# Patient Record
Sex: FEMALE | Race: WHITE | Employment: FULL TIME | URBAN - METROPOLITAN AREA
[De-identification: names, ages, dates, MRNs, and addresses within clinical notes are randomized per-mention and may not be internally consistent; named-entity substitution may affect disease eponyms.]

---

## 2017-01-03 ENCOUNTER — TELEPHONE (OUTPATIENT)
Dept: ONCOLOGY | Age: 52
End: 2017-01-03

## 2017-01-03 RX ORDER — ANASTROZOLE 1 MG/1
1 TABLET ORAL DAILY
Qty: 90 TAB | Refills: 3 | Status: SHIPPED | OUTPATIENT
Start: 2017-01-03 | End: 2017-06-13

## 2017-01-03 NOTE — TELEPHONE ENCOUNTER
Patient called requesting a return call from a nurse to get her results from her estrogen testing. CB# is [phone]. Thanks.

## 2017-01-03 NOTE — TELEPHONE ENCOUNTER
Called and advised patient of lab results and that per Dr. Marnie Auguste, it was ok to start her Anastrozole. Patient voices understanding and denies any further questions at this time.

## 2017-01-16 ENCOUNTER — APPOINTMENT (OUTPATIENT)
Dept: INFUSION THERAPY | Age: 52
End: 2017-01-16

## 2017-01-19 ENCOUNTER — TELEPHONE (OUTPATIENT)
Dept: ONCOLOGY | Age: 52
End: 2017-01-19

## 2017-01-19 RX ORDER — DIPHENHYDRAMINE HCL 25 MG
50 CAPSULE ORAL ONCE
Status: ACTIVE | OUTPATIENT
Start: 2017-01-24 | End: 2017-01-25

## 2017-01-19 RX ORDER — SODIUM CHLORIDE 9 MG/ML
25 INJECTION, SOLUTION INTRAVENOUS CONTINUOUS
Status: DISPENSED | OUTPATIENT
Start: 2017-01-24 | End: 2017-01-25

## 2017-01-24 ENCOUNTER — HOSPITAL ENCOUNTER (OUTPATIENT)
Dept: INFUSION THERAPY | Age: 52
Discharge: HOME | End: 2017-01-24

## 2017-01-24 RX ORDER — SODIUM CHLORIDE 9 MG/ML
10 INJECTION INTRAMUSCULAR; INTRAVENOUS; SUBCUTANEOUS AS NEEDED
Status: ACTIVE | OUTPATIENT
Start: 2017-01-24 | End: 2017-01-25

## 2017-01-24 RX ORDER — SODIUM CHLORIDE 0.9 % (FLUSH) 0.9 %
5-10 SYRINGE (ML) INJECTION AS NEEDED
Status: ACTIVE | OUTPATIENT
Start: 2017-01-24 | End: 2017-01-24

## 2017-01-24 RX ORDER — HEPARIN 100 UNIT/ML
500 SYRINGE INTRAVENOUS AS NEEDED
Status: ACTIVE | OUTPATIENT
Start: 2017-01-24 | End: 2017-01-25

## 2017-01-24 NOTE — PROGRESS NOTES
[facility] - Chemotherapy Progress Note    1300 Pt admit to [facility] for C4 Herceptin ambulatory in stable condition. Assessment completed. No new concerns voiced. PAC with positive blood return and flush. Chemotherapy Flowsheet 1/24/2017   Cycle C4   Date 1/24/2017   Drug / Regimen Herceptin   Notes 90 minutes per pt       There were no vitals taken for this visit. Medications:  NS IV  Herceptin IV      *** Pt tolerated treatment well. PAC maintained positive blood return throughout treatment, flushed with positive blood return at conclusion and de accessed. D/c home ambulatory in no distress.  Pt aware of next appointment scheduled for ***.    ***labs

## 2017-01-26 ENCOUNTER — TELEPHONE (OUTPATIENT)
Dept: ONCOLOGY | Age: 52
End: 2017-01-26

## 2017-01-26 RX ORDER — DIPHENHYDRAMINE HCL 25 MG
50 CAPSULE ORAL ONCE
Status: ACTIVE | OUTPATIENT
Start: 2017-01-31 | End: 2017-02-01

## 2017-01-26 RX ORDER — SODIUM CHLORIDE 9 MG/ML
25 INJECTION, SOLUTION INTRAVENOUS CONTINUOUS
Status: DISPENSED | OUTPATIENT
Start: 2017-01-31 | End: 2017-02-01

## 2017-01-26 NOTE — TELEPHONE ENCOUNTER
Called the patient and left a message that Dr. Juli Khoury recommends delaying for a week due to mono and that this office will call the patient back tomorrow 1/27/17 to re-schedule her appointments but to call Dr. Jese Mayorga office before 5:00 pm if she has any further questions or concerns.

## 2017-01-26 NOTE — TELEPHONE ENCOUNTER
Patient called and stated that she rescheduled her appointment earlier in the week because she thought she had strep but she just left her PCP and they said it is mono. Patient would like to know what this means for her treatment, should she still come in next week or wait? # [phone] patient stated that she has a phone conference at 3:30PM and it will take a while.

## 2017-01-31 ENCOUNTER — HOSPITAL ENCOUNTER (OUTPATIENT)
Dept: INFUSION THERAPY | Age: 52
Discharge: HOME | End: 2017-01-31

## 2017-01-31 ENCOUNTER — TELEPHONE (OUTPATIENT)
Dept: ONCOLOGY | Age: 52
End: 2017-01-31

## 2017-01-31 NOTE — TELEPHONE ENCOUNTER
Called the patient and verified ID x 2. The patient stated that her appointment was re-scheduled for 2/7/17 and that blood work was not drawn to determine that she has mono, that she has been on a Z-pack for at least two weeks, did not check her temperature so was not able to verify a fever the last two weeks but she has not felt like she has had a fever since she had cold sweats over two weeks ago and went to a doctor with a temperature of 101, and has been taking over-the-counter medication for her sinus \"issues\" and inquired if lab work will be drawn before her office visit. Informed the patient that lab work is usually drawn before an office visit with Dr. Damaris Summers and treatment is usually after the blood work and the office visit with Dr. Damaris Summers. Encouraged the patient to call Dr. Griselda Howard office on Friday to determine if the appointment on 2/7/17 should be re-scheduled due to how she feels. Also, informed the patient that she is due for an ECHO as well. The patient verbalized understanding and stated that she is available anytime this week for an ECHO and denied any further questions or concerns.

## 2017-01-31 NOTE — TELEPHONE ENCOUNTER
Called the patient and verified ID x 2. Informed the patient that she is scheduled for an ECHO at [facility] on Thursday 2/2/17 at 10:00 am.  The patient verbalized understanding and stated that she will be able to make that date and time. The patient stated that she finished radiation at the beginning of last October and inquired when she should have a mammogram and if she should be concerned that the lymph nodes in her neck are tender and the lymph nodes under her arms are swollen. Informed the patient that Dr. Stan Rob and Cesia Diaz NP will be made aware of her concerns and this office will call her back with updates. The patient verbalized understanding and denied any further questions or concerns.

## 2017-02-02 ENCOUNTER — CLINICAL SUPPORT (OUTPATIENT)
Dept: CARDIOLOGY CLINIC | Age: 52
End: 2017-02-02

## 2017-02-02 DIAGNOSIS — C50.912 MALIGNANT NEOPLASM OF LEFT FEMALE BREAST, UNSPECIFIED SITE OF BREAST: Primary | ICD-10-CM

## 2017-02-02 RX ORDER — DIPHENHYDRAMINE HCL 25 MG
50 CAPSULE ORAL ONCE
Status: COMPLETED | OUTPATIENT
Start: 2017-02-07 | End: 2017-02-07

## 2017-02-02 RX ORDER — SODIUM CHLORIDE 9 MG/ML
25 INJECTION, SOLUTION INTRAVENOUS CONTINUOUS
Status: DISPENSED | OUTPATIENT
Start: 2017-02-07 | End: 2017-02-08

## 2017-02-02 RX ORDER — DIPHENHYDRAMINE HCL 25 MG
50 CAPSULE ORAL ONCE
Status: DISCONTINUED | OUTPATIENT
Start: 2017-02-07 | End: 2017-02-02

## 2017-02-02 RX ORDER — SODIUM CHLORIDE 9 MG/ML
25 INJECTION, SOLUTION INTRAVENOUS CONTINUOUS
Status: DISCONTINUED | OUTPATIENT
Start: 2017-02-07 | End: 2017-02-02

## 2017-02-07 ENCOUNTER — HOSPITAL ENCOUNTER (OUTPATIENT)
Dept: INFUSION THERAPY | Age: 52
Discharge: HOME | End: 2017-02-07
Payer: COMMERCIAL

## 2017-02-07 ENCOUNTER — OFFICE VISIT (OUTPATIENT)
Dept: ONCOLOGY | Age: 52
End: 2017-02-07

## 2017-02-07 VITALS
WEIGHT: 201.6 LBS | BODY MASS INDEX: 33.55 KG/M2 | OXYGEN SATURATION: 98 % | TEMPERATURE: 97.8 F | DIASTOLIC BLOOD PRESSURE: 84 MMHG | HEART RATE: 84 BPM | SYSTOLIC BLOOD PRESSURE: 125 MMHG | RESPIRATION RATE: 18 BRPM

## 2017-02-07 VITALS
SYSTOLIC BLOOD PRESSURE: 119 MMHG | TEMPERATURE: 97.3 F | HEART RATE: 83 BPM | BODY MASS INDEX: 33.59 KG/M2 | HEIGHT: 65 IN | WEIGHT: 201.6 LBS | RESPIRATION RATE: 18 BRPM | OXYGEN SATURATION: 99 % | DIASTOLIC BLOOD PRESSURE: 84 MMHG

## 2017-02-07 DIAGNOSIS — R63.5 WEIGHT GAIN: ICD-10-CM

## 2017-02-07 DIAGNOSIS — J01.01 ACUTE RECURRENT MAXILLARY SINUSITIS: ICD-10-CM

## 2017-02-07 DIAGNOSIS — G62.9 NEUROPATHY: ICD-10-CM

## 2017-02-07 DIAGNOSIS — C50.912 MALIGNANT NEOPLASM OF LEFT FEMALE BREAST, UNSPECIFIED SITE OF BREAST: Primary | ICD-10-CM

## 2017-02-07 DIAGNOSIS — R53.0 NEOPLASTIC MALIGNANT RELATED FATIGUE: ICD-10-CM

## 2017-02-07 PROCEDURE — 74011250636 HC RX REV CODE- 250/636: Performed by: INTERNAL MEDICINE

## 2017-02-07 PROCEDURE — 77030012965 HC NDL HUBR BBMI -A

## 2017-02-07 PROCEDURE — 74011250637 HC RX REV CODE- 250/637: Performed by: INTERNAL MEDICINE

## 2017-02-07 PROCEDURE — 96413 CHEMO IV INFUSION 1 HR: CPT

## 2017-02-07 RX ORDER — SODIUM CHLORIDE 0.9 % (FLUSH) 0.9 %
10-40 SYRINGE (ML) INJECTION AS NEEDED
Status: ACTIVE | OUTPATIENT
Start: 2017-02-07 | End: 2017-02-08

## 2017-02-07 RX ORDER — HEPARIN 100 UNIT/ML
500 SYRINGE INTRAVENOUS AS NEEDED
Status: ACTIVE | OUTPATIENT
Start: 2017-02-07 | End: 2017-02-08

## 2017-02-07 RX ORDER — DOXYCYCLINE 100 MG/1
100 TABLET ORAL 2 TIMES DAILY
Qty: 14 TAB | Refills: 0 | Status: SHIPPED | OUTPATIENT
Start: 2017-02-07 | End: 2017-02-14

## 2017-02-07 RX ORDER — SODIUM CHLORIDE 9 MG/ML
10 INJECTION INTRAMUSCULAR; INTRAVENOUS; SUBCUTANEOUS AS NEEDED
Status: ACTIVE | OUTPATIENT
Start: 2017-02-07 | End: 2017-02-08

## 2017-02-07 RX ADMIN — SODIUM CHLORIDE 25 ML/HR: 900 INJECTION, SOLUTION INTRAVENOUS at 15:29

## 2017-02-07 RX ADMIN — DIPHENHYDRAMINE HYDROCHLORIDE 50 MG: 25 CAPSULE ORAL at 14:52

## 2017-02-07 RX ADMIN — Medication 540 MG: at 15:35

## 2017-02-07 NOTE — PROGRESS NOTES
[employer]  [address], [address]  [place] [address]  W: [phone]  F: [phone]     f/u HEME/ONC CONSULT    Reason for visit: evaluation for treatment for    Breast Cancer    Consulting physicians:  Dr. James Duvall; Dr. Cl Valentin    HPI:   Mohsen Norwood is a 49 y.o.  female who is transferring her care for management of breast cancer. 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 3/9/16 left lumpectomy showed 1.3 cm of IDC, ER + at 82%, IL negative, HER 2 positive (IHC 3+; ratio 5; sig/cell 7.5),  0/2 LN involved, ki67 68%, gr 3, multiple satelite nodules up to 4 mm. [id number]. Was treated at [address] [place] [address] with Dr. Marcene Mcburney. Received TH as in APT x 12 weeks (took 14 weeks due to neutropenia). Taxol was ended up DR by 30%. 4/22/16 this started, ended 7/22/16. Did have stomatitis with the outback herceptin. Started 8/19/16    S/p XRT 10/7/16    Did have a bone density study, normal per pt -- done by Dr. Ana Escobar. Interval history:  In today for outback herceptin. Complains of gr 2 loss of appetite, gr 2 bleeding, gr 1 diarrhea, gr 1-2 fatigue, gr 1 chills, gr 1 hot flashes, gr 1-2 insomnia, gr 1-2 anxiety, gr 1 incontinence, gr 1 vaginal dryness. She was treated for strep/mono since we last saw her. She just got back from the [facility]. She leaves for [employer] this weekend. She stopped her anastrozole when she got sick but is going to restart today. Is walking 3-4 hours a day. DX   Encounter Diagnosis   Name Primary?     Malignant neoplasm of left female breast, unspecified site of breast ([facility] Yes      Past Medical History   Diagnosis Date    Abnormal Pap smear 1990s     cryotherapy, no abnormals since    Contact dermatitis and other eczema, due to unspecified cause     Depression     Endometriosis     Malignant neoplasm of left female breast ([facility] 3/28/2016    Oligomenorrhea currently not having menstrual cycles X 3 months     Past Surgical History   Procedure Laterality Date    Hx heent       scarring from sinusitis.  Hx gyn       cervical cryotherapy      Social History     Social History    Marital status:      Spouse name: N/A    Number of children: 1    Years of education: N/A     Occupational History     [employer]      Social History Main Topics    Smoking status: Former Smoker    Smokeless tobacco: None    Alcohol use 1.0 oz/week     2 Cans of beer per week    Drug use: None    Sexual activity: Not Currently     Partners: Male     Birth control/ protection: Condom     Other Topics Concern    None     Social History Narrative     History reviewed. No pertinent family history. Current Outpatient Prescriptions   Medication Sig Dispense Refill    protein powd Take  by mouth daily.  Biotin 2,500 mcg cap Take  by mouth daily.  diazepam (VALIUM) 2 mg tablet Take 2 mg by mouth nightly as needed. 3    CYANOCOBALAMIN, VITAMIN B-12, (VITAMIN B-12 PO) Take  by mouth daily.  CHOLECALCIFEROL, VITAMIN D3, (VITAMIN D3 PO) Take 1,000 Units by mouth daily.  LACTOBACILLUS ACIDOPHILUS (PROBIOTIC PO) Take  by mouth daily.  MULTIVITAMIN (MULTIPLE VITAMIN PO) Take  by mouth daily.  anastrozole (ARIMIDEX) 1 mg tablet Take 1 Tab by mouth daily. 90 Tab 3    lidocaine-prilocaine (EMLA) topical cream Apply  to affected area as needed for Pain. 30 g 0    ibuprofen (ADVIL) 200 mg tablet Take 200 mg by mouth.  ondansetron (ZOFRAN ODT) 4 mg disintegrating tablet Take 1 Tab by mouth every eight (8) hours as needed for Nausea.  20 Tab 0     Facility-Administered Medications Ordered in Other Visits   Medication Dose Route Frequency Provider Last Rate Last Dose    sodium chloride 0.9 % injection 10 mL  10 mL IntraVENous PRN Marianne Mace MD        heparin (porcine) pf 500 Units  500 Units IntraVENous PRN Marianne Mace MD       [facility] sodium chloride (NS) flush 10-40 mL  10-40 mL IntraVENous PRN Gigi Hodges MD        0.9% sodium chloride infusion  25 mL/hr IntraVENous CONTINUOUS Gigi Hodges MD        diphenhydrAMINE (BENADRYL) capsule 50 mg  50 mg Oral ONCE Gigi Hodges MD        trastuzumab (HERCEPTIN) 540 mg in 0.9% sodium chloride 250 mL, overfill volume 25 mL IVPB  540 mg IntraVENous ONCE Gigi Hodges MD         Allergies   Allergen Reactions    Augmentin [Amoxicillin-Pot Clavulanate] Nausea and Vomiting     Projectile vomiting, sweats, profuse diarrhea    Gluten Other (comments)     insensitity    Other Medication Hives     H2 antagonists,      Review of Systems    A comprehensive review of systems was performed and all systems were negative except for HPI and for the symptom review form, reviewed and scanned in.    Objective:  Visit Vitals    /84    Pulse 83    Temp 97.3 °F (36.3 °C) (Temporal)    Resp 18    Ht 5' 5\" (1.651 m)    Wt 201 lb 9.6 oz (91.4 kg)    SpO2 99%    BMI 33.55 kg/m2         General:  Alert, cooperative, no distress, appears stated age. Head:  Normocephalic, without obvious abnormality, atraumatic. Eyes:  Conjunctivae/corneas clear. PERRL, EOMs intact. Throat: Lips, mucosa, and tongue normal.    Neck: Supple, symmetrical, trachea midline, no adenopathy, thyroid: no enlargement/tenderness/nodules   Back:   Symmetric, no curvature. ROM normal. No CVA tenderness. Lungs:   Clear to auscultation bilaterally. Chest wall:  No tenderness or deformity. Heart:  Regular rate and rhythm, S1, S2 normal, no murmur, click, rub or gallop. Abdomen:   Soft, non-tender. Bowel sounds normal. No masses,  No organomegaly. Extremities: Extremities normal, atraumatic, no cyanosis or edema. Skin: Skin color, texture, turgor normal. No rashes or lesions. Lymph nodes: Cervical, supraclavicular, and axillary nodes normal.   Neurologic: CNII-XII intact.        Diagnostic Imaging   No results found for this or any previous visit. Lab Results  Lab Results   Component Value Date/Time    WBC 6.5 10/03/2014 03:36 PM    HGB 13.6 10/03/2014 03:36 PM    HCT 41.2 10/03/2014 03:36 PM    PLATELET 167 07/31/2737 03:36 PM    MCV 91 10/03/2014 03:36 PM     Lab Results   Component Value Date/Time    Sodium 142 11/14/2016 04:12 PM    Potassium 3.8 11/14/2016 04:12 PM    Chloride 105 11/14/2016 04:12 PM    CO2 32 11/14/2016 04:12 PM    Anion gap 5 11/14/2016 04:12 PM    Glucose 111 11/14/2016 04:12 PM    BUN 10 11/14/2016 04:12 PM    Creatinine 0.77 11/14/2016 04:12 PM    BUN/Creatinine ratio 13 11/14/2016 04:12 PM    GFR est AA >60 11/14/2016 04:12 PM    GFR est non-AA >60 11/14/2016 04:12 PM    Calcium 8.5 11/14/2016 04:12 PM     Assessment/Plan:  49 y.o. female with L IDC, ER +, NV negative, HER 2 +, 1.3 cm, gr 3, 0/2 LN involved. PS 0    1. Left Breast cancer stage: IA    Hormonal therapy: administered    We explained to the patient that the goal of systemic adjuvant therapy is to improve the chances for cure and decrease the risk of relapse. We explained why a patient can have microscopic cancer spread now even though physical examination, laboratory studies and imaging studies are negative for cancer. We explained that the same treatments used now as adjuvant or preventive treatments rarely if ever are curative in women who develop metastases. Using PREDICT!, without therapy, her 10 year OS is 79%; with therapy that increases to 89%, endocrine therapy is 5% of that. Rationale for therapy with trastuzumab was also discussed with the patient including a 50% proportional improvement in disease free survival and also an improvement in overall survival in patients receiving trastuzumab and chemotherapy for HER-2 positive breast cancer. The side effects of trastuzumab were discussed including a 4%-5% risk of dropping her ejection fraction while on treatment and about a 1% risk of CHF.   We discussed that this drug will be used every 3 weeks for remainder of a year following the chemotherapy cycles. We will check her EF before chemotherapy and every 3 months while she is receiving trastuzumab. Informed consent signed. She feels that she is still cycling, but not having periods. FSH, LH, and estradiol reviewed from 6/1/15, appears postmenopausal, but estradiol not < 6, but close. Checked her [facility], LH, estradiol in 12/2016 which confirmed that she is postmenopausal. Has not had a period in 3 years. Resume anastrozole 1 mg daily. In today for outback Herceptin #4 will see her in 3 weeks for #5. TTE on 2/2/17, EF 69%. Next due before 4/27/17. Eliel mammogram and breast MRI due at the end of March. She would prefer these at [facility], we will have Dr. Marcello Lynch order these. I recommend yearly mammogram and breast MRI. 2. Emotional well being: She has excellent support and is coping well with her disease. 3. Side effects from trastuzumab: Including muscle pain, mouth sores, fatigue. Will monitor, pre med with benadryl. 4. 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 Mostly in fingers. Will monitor. 5. Weight gain/loss of appetite: She has a desire to get back down to her pre chemo weight. Abbie Perea RD has reached out to her. 6. Pharyngitis/Sinusitis: maxillary sinus tenderness, treated with z alonso for 2 weeks with minimal improvement. Will start Doxycycline 100 mg bid x 7 days, rx in.    > 25 minutes were spent with this patient with > 50% of that time spent in face to face counseling. There are no Patient Instructions on file for this visit.    Follow-up Disposition: Not on Colt Guaman MD

## 2017-02-07 NOTE — MR AVS SNAPSHOT
Visit Information Date & Time Provider Department Dept. Phone Encounter #  
 2/7/2017  1:45 PM Aminta Nath NP [facility] at [address] [facility] [phone] Follow-up Instructions Return in about 3 weeks (around 2/28/2017) for fu, sand/mikael, opic herceptin 5. Upcoming Health Maintenance Date Due Pneumococcal 19-64 Highest Risk (1 of 3 - PCV13) 2/15/1985 DTaP/Tdap/Td series (1 - Tdap) 2/15/1987 FOBT Q 1 YEAR AGE 50-75 2/15/2016 INFLUENZA AGE 9 TO ADULT 8/1/2016 BREAST CANCER SCRN MAMMOGRAM 2/12/2018 PAP AKA CERVICAL CYTOLOGY 6/1/2018 Allergies as of 2/7/2017  Review Complete On: 2/7/2017 By: Aminta Nath NP Severity Noted Reaction Type Reactions Augmentin [Amoxicillin-pot Clavulanate]  08/29/2013    Nausea and Vomiting Projectile vomiting, sweats, profuse diarrhea Gluten  09/24/2014    Other (comments)  
 insensitity Other Medication  06/24/2013    Hives H2 antagonists,   
  
Current Immunizations  Reviewed on 11/14/2016 No immunizations on file. Not reviewed this visit You Were Diagnosed With   
  
 Codes Comments Malignant neoplasm of left female breast, unspecified site of breast ([facility]    -  Primary ICD-10-CM: N15.467 ICD-9-CM: 174.9 Neuropathy     ICD-10-CM: G62.9 ICD-9-CM: 355.9 Weight gain     ICD-10-CM: R63.5 ICD-9-CM: 783.1 Neoplastic malignant related fatigue     ICD-10-CM: R53.0 ICD-9-CM: 780.79 Vitals BP Pulse Temp Resp Height(growth percentile) Weight(growth percentile) 119/84 83 97.3 °F (36.3 °C) (Temporal) 18 5' 5\" (1.651 m) 201 lb 9.6 oz (91.4 kg) SpO2 BMI OB Status Smoking Status 99% 33.55 kg/m2 Menopause Former Smoker Vitals History BMI and BSA Data Body Mass Index Body Surface Area  
 33.55 kg/m 2 2.05 m 2 Preferred Pharmacy Pharmacy Name Phone [id number]- [address] [address], [address] [phone] Your Updated Medication List  
  
   
This list is accurate as of: 2/7/17  2:21 PM.  Always use your most recent med list. ADVIL 200 mg tablet Generic drug:  ibuprofen Take 200 mg by mouth. anastrozole 1 mg tablet Commonly known as:  ARIMIDEX Take 1 Tab by mouth daily. Biotin 2,500 mcg Cap Take  by mouth daily. diazePAM 2 mg tablet Commonly known as:  VALIUM Take 2 mg by mouth nightly as needed. doxycycline 100 mg tablet Commonly known as:  ADOXA Take 1 Tab by mouth two (2) times a day for 7 days. lidocaine-prilocaine topical cream  
Commonly known as:  EMLA Apply  to affected area as needed for Pain. MULTIPLE VITAMIN PO Take  by mouth daily. ondansetron 4 mg disintegrating tablet Commonly known as:  ZOFRAN ODT Take 1 Tab by mouth every eight (8) hours as needed for Nausea. PROBIOTIC PO Take  by mouth daily. protein Powd Take  by mouth daily. VITAMIN B-12 PO Take  by mouth daily. VITAMIN D3 PO Take 1,000 Units by mouth daily. Prescriptions Sent to Pharmacy Refills  
 doxycycline (ADOXA) 100 mg tablet 0 Sig: Take 1 Tab by mouth two (2) times a day for 7 days. Class: Normal  
 Pharmacy: [address] [place] [address], [address] Ph #: [phone] Route: Oral  
  
Follow-up Instructions Return in about 3 weeks (around 2/28/2017) for fu, sand/mikael, patricia herceptin 5.   
  
To-Do List   
 02/28/2017 2:00 PM  
  Appointment with [address] at [place] [address] ([phone])  
  
 03/21/2017 1:00 PM  
  Appointment with [address] at [place] [address] ([phone])  
  
 04/11/2017 1:00 PM  
  Appointment with [address] at [place] [address] ([phone])  
  
 05/02/2017 1:00 PM  
 Appointment with [address] at [place] [address] ([phone]) Patient Instructions Come see us in 3 weeks. Introducing [facility]! Mary Ibrahim introduces [employer] patient portal. Now you can access parts of your medical record, email your doctor's office, and request medication refills online. 1. In your internet browser, go to [email or web address] 2. Click on the First Time User? Click Here link in the Sign In box. You will see the New Member Sign Up page. 3. Enter your [employer] Access Code exactly as it appears below. You will not need to use this code after youve completed the sign-up process. If you do not sign up before the expiration date, you must request a new code. · [employer] Access Code: [email or web address] Expires: 3/5/2017  2:04 PM 
 
4. Enter the last four digits of your Social Security Number (xxxx) and Date of Birth (mm/dd/yyyy) as indicated and click Submit. You will be taken to the next sign-up page. 5. Create a [employer] ID. This will be your [employer] login ID and cannot be changed, so think of one that is secure and easy to remember. 6. Create a [employer] password. You can change your password at any time. 7. Enter your Password Reset Question and Answer. This can be used at a later time if you forget your password. 8. Enter your e-mail address. You will receive e-mail notification when new information is available in [address]. 9. Click Sign Up. You can now view and download portions of your medical record. 10. Click the Download Summary menu link to download a portable copy of your medical information. If you have questions, please visit the Frequently Asked Questions section of the [employer] website. Remember, [employer] is NOT to be used for urgent needs. For medical emergencies, dial 911. Now available from your iPhone and Android! Please provide this summary of care documentation to your next provider. Your primary care clinician is listed as Yajaira Wagner. If you have any questions after today's visit, please call [phone].

## 2017-02-07 NOTE — PROGRESS NOTES
[facility] VISIT NOTE    1310  Pt arrived at [place] ambulatory and in no distress for C4 herceptin. Assessment completed, pt has no concern. Chemotherapy Flowsheet 2/7/2017   Cycle C5   Date 2/7/2017   Drug / Regimen herceptin   Notes 90 min per patient request        Right chest port accessed with 0.75 in [place] with no difficulty. Positive blood return noted and labs drawn. Medications received:  Trastuzumab    Tolerated treatment well, no adverse reaction noted. Port de-accessed and flushed per protocol. Positive blood return noted. Patient Vitals for the past 12 hrs:   Temp Pulse Resp BP SpO2   02/07/17 1707 97.8 °F (36.6 °C) 84 18 125/84 98 %   02/07/17 1312 97.3 °F (36.3 °C) 83 18 119/84 99 %       1710  D/C'd from [place] ambulatory and in no distress .

## 2017-02-07 NOTE — PROGRESS NOTES
[address], [address] [facility] [place] [address]  W: [phone]  F: [phone]     f/u HEME/ONC CONSULT    Reason for visit: evaluation for treatment for    Breast Cancer    Consulting physicians:  Dr. Kathy Verdugo; Dr. Neli Gilmore    HPI:   Judge Mitchell is a 49 y.o.  female who is transferring her care for management of breast cancer. 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 3/9/16 left lumpectomy showed 1.3 cm of IDC, ER + at 82%, IA negative, HER 2 positive (IHC 3+; ratio 5; sig/cell 7.5),  0/2 LN involved, ki67 68%, gr 3, multiple satelite nodules up to 4 mm. [id number]. Was treated at [address] [place] [address] with Dr. Florencia Pantoja. Received TH as in APT x 12 weeks (took 14 weeks due to neutropenia). Taxol was ended up DR by 30%. 4/22/16 this started, ended 7/22/16. Did have stomatitis with the outback herceptin. Started 8/19/16    S/p XRT 10/7/16    Did have a bone density study, normal per pt -- done by Dr. Clark Bobo. Interval history:  In today for outback herceptin. Complains of gr 2 loss of appetite, gr 2 bleeding, gr 1 diarrhea, gr 1-2 fatigue, gr 1 chills, gr 1 hot flashes, gr 1-2 insomnia, gr 1-2 anxiety, gr 1 incontinence, gr 1 vaginal dryness. She was treated for strep/mono since we last saw her. She just got back from the [facility]. She leaves for [place] this weekend. She stopped her anastrozole when she got sick but is going to restart today. Is walking 3-4 hours a day. DX   Encounter Diagnosis   Name Primary?     Malignant neoplasm of left female breast, unspecified site of breast ([facility] Yes      Past Medical History   Diagnosis Date    Abnormal Pap smear 1990s     cryotherapy, no abnormals since    Contact dermatitis and other eczema, due to unspecified cause     Depression     Endometriosis     Malignant neoplasm of left female breast ([facility] 3/28/2016    Oligomenorrhea currently not having menstrual cycles X 3 months     Past Surgical History   Procedure Laterality Date    Hx heent       scarring from sinusitis.  Hx gyn       cervical cryotherapy      Social History     Social History    Marital status:      Spouse name: N/A    Number of children: 1    Years of education: N/A     Occupational History     [employer]      Social History Main Topics    Smoking status: Former Smoker    Smokeless tobacco: None    Alcohol use 1.0 oz/week     2 Cans of beer per week    Drug use: None    Sexual activity: Not Currently     Partners: Male     Birth control/ protection: Condom     Other Topics Concern    None     Social History Narrative     History reviewed. No pertinent family history. Current Outpatient Prescriptions   Medication Sig Dispense Refill    protein powd Take  by mouth daily.  Biotin 2,500 mcg cap Take  by mouth daily.  diazepam (VALIUM) 2 mg tablet Take 2 mg by mouth nightly as needed. 3    CYANOCOBALAMIN, VITAMIN B-12, (VITAMIN B-12 PO) Take  by mouth daily.  CHOLECALCIFEROL, VITAMIN D3, (VITAMIN D3 PO) Take 1,000 Units by mouth daily.  LACTOBACILLUS ACIDOPHILUS (PROBIOTIC PO) Take  by mouth daily.  MULTIVITAMIN (MULTIPLE VITAMIN PO) Take  by mouth daily.  anastrozole (ARIMIDEX) 1 mg tablet Take 1 Tab by mouth daily. 90 Tab 3    lidocaine-prilocaine (EMLA) topical cream Apply  to affected area as needed for Pain. 30 g 0    ibuprofen (ADVIL) 200 mg tablet Take 200 mg by mouth.  ondansetron (ZOFRAN ODT) 4 mg disintegrating tablet Take 1 Tab by mouth every eight (8) hours as needed for Nausea.  20 Tab 0     Facility-Administered Medications Ordered in Other Visits   Medication Dose Route Frequency Provider Last Rate Last Dose    sodium chloride 0.9 % injection 10 mL  10 mL IntraVENous PRN Lisa Montes MD        heparin (porcine) pf 500 Units  500 Units IntraVENous PRN MD Sinai Clements sodium chloride (NS) flush 10-40 mL  10-40 mL IntraVENous PRN Gigi Hodges MD        0.9% sodium chloride infusion  25 mL/hr IntraVENous CONTINUOUS Gigi Hodges MD        diphenhydrAMINE (BENADRYL) capsule 50 mg  50 mg Oral ONCE Gigi Hodges MD        trastuzumab (HERCEPTIN) 540 mg in 0.9% sodium chloride 250 mL, overfill volume 25 mL IVPB  540 mg IntraVENous ONCE Gigi Hodges MD         Allergies   Allergen Reactions    Augmentin [Amoxicillin-Pot Clavulanate] Nausea and Vomiting     Projectile vomiting, sweats, profuse diarrhea    Gluten Other (comments)     insensitity    Other Medication Hives     H2 antagonists,      Review of Systems    A comprehensive review of systems was performed and all systems were negative except for HPI and for the symptom review form, reviewed and scanned in.    Objective:  Visit Vitals    /84    Pulse 83    Temp 97.3 °F (36.3 °C) (Temporal)    Resp 18    Ht 5' 5\" (1.651 m)    Wt 201 lb 9.6 oz (91.4 kg)    SpO2 99%    BMI 33.55 kg/m2         General:  Alert, cooperative, no distress, appears stated age. Head:  Normocephalic, without obvious abnormality, atraumatic. Eyes:  Conjunctivae/corneas clear. PERRL, EOMs intact. Throat: Lips, mucosa, and tongue normal.    Neck: Supple, symmetrical, trachea midline, no adenopathy, thyroid: no enlargement/tenderness/nodules   Back:   Symmetric, no curvature. ROM normal. No CVA tenderness. Lungs:   Clear to auscultation bilaterally. Chest wall:  No tenderness or deformity. Heart:  Regular rate and rhythm, S1, S2 normal, no murmur, click, rub or gallop. Abdomen:   Soft, non-tender. Bowel sounds normal. No masses,  No organomegaly. Extremities: Extremities normal, atraumatic, no cyanosis or edema. Skin: Skin color, texture, turgor normal. No rashes or lesions. Lymph nodes: Cervical, supraclavicular, and axillary nodes normal.   Neurologic: CNII-XII intact.        Diagnostic Imaging   No results found for this or any previous visit. Lab Results  Lab Results   Component Value Date/Time    WBC 6.5 10/03/2014 03:36 PM    HGB 13.6 10/03/2014 03:36 PM    HCT 41.2 10/03/2014 03:36 PM    PLATELET 794 16/38/8830 03:36 PM    MCV 91 10/03/2014 03:36 PM     Lab Results   Component Value Date/Time    Sodium 142 11/14/2016 04:12 PM    Potassium 3.8 11/14/2016 04:12 PM    Chloride 105 11/14/2016 04:12 PM    CO2 32 11/14/2016 04:12 PM    Anion gap 5 11/14/2016 04:12 PM    Glucose 111 11/14/2016 04:12 PM    BUN 10 11/14/2016 04:12 PM    Creatinine 0.77 11/14/2016 04:12 PM    BUN/Creatinine ratio 13 11/14/2016 04:12 PM    GFR est AA >60 11/14/2016 04:12 PM    GFR est non-AA >60 11/14/2016 04:12 PM    Calcium 8.5 11/14/2016 04:12 PM     Assessment/Plan:  49 y.o. female with L IDC, ER +, NH negative, HER 2 +, 1.3 cm, gr 3, 0/2 LN involved. PS 0    1. Left Breast cancer stage: IA    Hormonal therapy: administered    We explained to the patient that the goal of systemic adjuvant therapy is to improve the chances for cure and decrease the risk of relapse. We explained why a patient can have microscopic cancer spread now even though physical examination, laboratory studies and imaging studies are negative for cancer. We explained that the same treatments used now as adjuvant or preventive treatments rarely if ever are curative in women who develop metastases. Using PREDICT!, without therapy, her 10 year OS is 79%; with therapy that increases to 89%, endocrine therapy is 5% of that. Rationale for therapy with trastuzumab was also discussed with the patient including a 50% proportional improvement in disease free survival and also an improvement in overall survival in patients receiving trastuzumab and chemotherapy for HER-2 positive breast cancer. The side effects of trastuzumab were discussed including a 4%-5% risk of dropping her ejection fraction while on treatment and about a 1% risk of CHF.   We discussed that this drug will be used every 3 weeks for remainder of a year following the chemotherapy cycles. We will check her EF before chemotherapy and every 3 months while she is receiving trastuzumab. Informed consent signed. She feels that she is still cycling, but not having periods. FSH, LH, and estradiol reviewed from 6/1/15, appears postmenopausal, but estradiol not < 6, but close. Checked her [address], LH, estradiol in 12/2016 which confirmed that she is postmenopausal. Has not had a period in 3 years. Resume anastrozole 1 mg daily. In today for outback Herceptin #4 will see her in 3 weeks for #5. TTE on 2/2/17, EF 69%. Next due before 4/27/17. Eliel mammogram and breast MRI due at the end of March. She would prefer these at [facility], we will have Dr. Alton Sumner order these. I recommend yearly mammogram and breast MRI. 2. Emotional well being: She has excellent support and is coping well with her disease. 3. Side effects from trastuzumab: Including muscle pain, mouth sores, fatigue. Will monitor, pre med with benadryl. 4. 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 Mostly in fingers. Will monitor. 5. Weight gain/loss of appetite: She has a desire to get back down to her pre chemo weight. Caleb Brown RD has reached out to her. 6. Pharyngitis/Sinusitis: maxillary sinus tenderness, treated with z alonso for 2 weeks with minimal improvement. Will start Doxycycline 100 mg bid x 7 days, rx in.    > 25 minutes were spent with this patient with > 50% of that time spent in face to face counseling. There are no Patient Instructions on file for this visit.    Follow-up Disposition: Not on Gianna Bright MD

## 2017-02-21 ENCOUNTER — APPOINTMENT (OUTPATIENT)
Dept: INFUSION THERAPY | Age: 52
End: 2017-02-21

## 2017-02-23 RX ORDER — DIPHENHYDRAMINE HCL 25 MG
50 CAPSULE ORAL ONCE
Status: COMPLETED | OUTPATIENT
Start: 2017-02-28 | End: 2017-02-28

## 2017-02-23 RX ORDER — SODIUM CHLORIDE 9 MG/ML
25 INJECTION, SOLUTION INTRAVENOUS CONTINUOUS
Status: DISPENSED | OUTPATIENT
Start: 2017-02-28 | End: 2017-03-01

## 2017-02-28 ENCOUNTER — HOSPITAL ENCOUNTER (OUTPATIENT)
Dept: INFUSION THERAPY | Age: 52
Discharge: HOME | End: 2017-02-28
Payer: COMMERCIAL

## 2017-02-28 VITALS
HEART RATE: 65 BPM | SYSTOLIC BLOOD PRESSURE: 116 MMHG | HEIGHT: 65 IN | RESPIRATION RATE: 16 BRPM | WEIGHT: 201.6 LBS | DIASTOLIC BLOOD PRESSURE: 79 MMHG | TEMPERATURE: 98.1 F | BODY MASS INDEX: 33.59 KG/M2

## 2017-02-28 PROCEDURE — 74011250636 HC RX REV CODE- 250/636

## 2017-02-28 PROCEDURE — 74011250636 HC RX REV CODE- 250/636: Performed by: INTERNAL MEDICINE

## 2017-02-28 PROCEDURE — 96413 CHEMO IV INFUSION 1 HR: CPT

## 2017-02-28 PROCEDURE — 74011250637 HC RX REV CODE- 250/637: Performed by: INTERNAL MEDICINE

## 2017-02-28 PROCEDURE — 77030012965 HC NDL HUBR BBMI -A

## 2017-02-28 RX ORDER — SODIUM CHLORIDE 0.9 % (FLUSH) 0.9 %
10-40 SYRINGE (ML) INJECTION AS NEEDED
Status: ACTIVE | OUTPATIENT
Start: 2017-02-28 | End: 2017-02-28

## 2017-02-28 RX ORDER — SODIUM CHLORIDE 9 MG/ML
10 INJECTION INTRAMUSCULAR; INTRAVENOUS; SUBCUTANEOUS AS NEEDED
Status: ACTIVE | OUTPATIENT
Start: 2017-02-28 | End: 2017-03-01

## 2017-02-28 RX ORDER — HEPARIN 100 UNIT/ML
500 SYRINGE INTRAVENOUS AS NEEDED
Status: ACTIVE | OUTPATIENT
Start: 2017-02-28 | End: 2017-03-01

## 2017-02-28 RX ADMIN — DIPHENHYDRAMINE HYDROCHLORIDE 50 MG: 25 CAPSULE ORAL at 15:32

## 2017-02-28 RX ADMIN — Medication 540 MG: at 16:32

## 2017-02-28 RX ADMIN — HEPARIN SODIUM (PORCINE) LOCK FLUSH IV SOLN 100 UNIT/ML 500 UNITS: 100 SOLUTION at 18:29

## 2017-02-28 RX ADMIN — SODIUM CHLORIDE 25 ML/HR: 900 INJECTION, SOLUTION INTRAVENOUS at 16:25

## 2017-02-28 RX ADMIN — Medication 20 ML: at 18:29

## 2017-02-28 NOTE — PROGRESS NOTES
[facility] VISIT NOTE    Pt arrived at [facility] ambulatory and in no distres. Pt c/o sinus infection which is not new. Patient Vitals for the past 12 hrs:   Temp Pulse Resp BP   02/28/17 1818 98.1 °F (36.7 °C) 65 16 116/79     Right chest port accessed with . 75  in [place] no difficulty. Positive blood return noted. Medications received:  Benedryl PO  Herceptin IV    Tolerated treatment well, no adverse reaction noted. Port de-accessed and flushed per protocol. Positive blood return noted. D/C'd from [facility] ambulatory and in no distress. Next appointment is 3/21/17 at 1:00.

## 2017-03-07 ENCOUNTER — TELEPHONE (OUTPATIENT)
Dept: ONCOLOGY | Age: 52
End: 2017-03-07

## 2017-03-07 NOTE — TELEPHONE ENCOUNTER
Called the patient and left a message to call Dr. Cindy See office back at her earliest convenience. Received a call from the patient and verified ID x 2. The patient stated that she is scheduled for an infusion in [facility] and an appointment with Dr. Maudie Gosselin on 3/21/17 but will be out of town on business and will be back on 3/22/17 but may be able to make the already scheduled appointment depending on the answer to her next question. The patient inquired how many more treatments she has left and what is the estimated date of time that her treatments will end. Informed the patient that her questions and concerns will be brought to Dr. Cindy See and Anant Baltazar NP attention and this office will call back with recommendations.

## 2017-03-07 NOTE — TELEPHONE ENCOUNTER
Patient called stating she would like a call back from either Addis or Juan Garcia concerning how many treatments she has left.  Call back number [phone]

## 2017-03-07 NOTE — TELEPHONE ENCOUNTER
Called the patient and left a message that her most recent infusion was number five of eleven treatments and to call Dr. Fredy Correia office if she has any further questions or concerns.

## 2017-03-07 NOTE — TELEPHONE ENCOUNTER
Patient called again requesting a return call because she will be going out of town soon and needs to discuss her treatment.  Call back number [phone]

## 2017-03-07 NOTE — TELEPHONE ENCOUNTER
Called the patient and verified ID x 2. The patient stated that she had surgery in March of last year, had her port placed on 4/19/16 and then started treatment at [address] [place] [address] in April of last year the same week she had her port placed and had twelve cycles of Herceptin in fourteen weeks and it was fourteen weeks because she had to stop treatment twice which delayed her treatment out to fourteen weeks. Then once she completed that she started her \"21 day dose\" and transferred her care to Dr. Addis Warren after receiving several \"21 day doses\" and by her calculations, including the three weeks that treatment was pushed back due to complications, she should only have two or three more cycles left to complete and not six. Informed the patient that Dr. Addis Warren will be made aware of her calculations and this office will call back with recommendations. The patient verbalized understanding and denied any further questions or concerns.     14 week cycle - Herceptin since April   Stopped twice = 14 weeks done on July 22nd (but only 12 doses)  Surgery - march  Port April 19th  21 day dose on   Push back 3 weeks in total    It is treatment of a year and has 6 more treatments for 18 weeks or 4.5 months - July 4th    2 or 3 out

## 2017-03-08 NOTE — TELEPHONE ENCOUNTER
Patient called to follow up with Adrian Kirkpatrick to see if he had a chance to talk to Dr. Vicki Manuel about how many cycles of Herceptin she had left.  # [phone]

## 2017-03-09 NOTE — TELEPHONE ENCOUNTER
Patient called and stated that she would like a return call back regarding how many treatments she has left.  # [phone]

## 2017-03-10 NOTE — TELEPHONE ENCOUNTER
Janeth Sim NP received a call from the patient and the patient stated that she received four doses of Herceptin at [facility] after the twelve week chemotherapy regimen. Karely Singh stated that if the patient is able to have [facility] fax the records that state that she received four doses, then Karely Singh will be able to reduce the number of Herceptin cycles from thirteen to nine. The patient also stated that she will be out of town when her next scheduled appointments with [facility] and Dr. Mary Ann Wallace are which they are both scheduled on 3/21/17 and that she is travelling back in to town on 3/23/17. Karely Singh stated that if she is able to receive Herceptin and not see Dr. Mary Ann Wallace on 3/24/17 then she will be able to keep her already scheduled appointment on 4/11/17 and then have Herceptin every three weeks from 4/11/17 and that this office will schedule an appointment to see Dr. Mary Ann Wallace on 4/11/17. The patient verbalized understanding and stated that she will have [facility] fax the records to Dr. Fredi Mayorga office and that she is available anytime for an infusion on 3/24/17. The patient denied any further questions or concerns.

## 2017-03-10 NOTE — TELEPHONE ENCOUNTER
Appointment made on 3/24/17 at 11:00 am in [facility].   Will call pt once appt for Dr. Silvio Lopez is made

## 2017-03-13 ENCOUNTER — TELEPHONE (OUTPATIENT)
Dept: ONCOLOGY | Age: 52
End: 2017-03-13

## 2017-03-13 NOTE — TELEPHONE ENCOUNTER
Called the patient and verified ID x 2. Informed the patient that Dr. Mary Ann Wallace recommends the patient's [facility] appointment and office visit with Dr. Mary Ann Wallace be rescheduled to 3/28/17 and then treatment every three weeks from 3/28/17. The patient stated that she scheduled a trip to the [facility] and will be out of the country from 4/16/17 to 4/29/17 when she would be due for her next treatment after 3/28/17. The patient also stated that she spoke with Janeth Sim NP last Friday 3/10/17 about continuing Anastrozole but she decided to stop taking it over the weekend and feels \"so much better. \"  Informed the patient that at the office visit on 3/28/17 the remainder of the treatment plan and schedule can be discussed. The patient verbalized understanding and denied any further questions or concerns.

## 2017-03-14 ENCOUNTER — APPOINTMENT (OUTPATIENT)
Dept: INFUSION THERAPY | Age: 52
End: 2017-03-14
Payer: COMMERCIAL

## 2017-03-21 ENCOUNTER — APPOINTMENT (OUTPATIENT)
Dept: INFUSION THERAPY | Age: 52
End: 2017-03-21
Payer: COMMERCIAL

## 2017-03-22 RX ORDER — DIPHENHYDRAMINE HCL 25 MG
50 CAPSULE ORAL ONCE
Status: COMPLETED | OUTPATIENT
Start: 2017-03-28 | End: 2017-03-28

## 2017-03-22 RX ORDER — SODIUM CHLORIDE 9 MG/ML
25 INJECTION, SOLUTION INTRAVENOUS CONTINUOUS
Status: DISPENSED | OUTPATIENT
Start: 2017-03-28 | End: 2017-03-28

## 2017-03-24 ENCOUNTER — APPOINTMENT (OUTPATIENT)
Dept: INFUSION THERAPY | Age: 52
End: 2017-03-24
Payer: COMMERCIAL

## 2017-03-28 ENCOUNTER — HOSPITAL ENCOUNTER (OUTPATIENT)
Dept: INFUSION THERAPY | Age: 52
Discharge: HOME | End: 2017-03-28
Payer: COMMERCIAL

## 2017-03-28 ENCOUNTER — OFFICE VISIT (OUTPATIENT)
Dept: ONCOLOGY | Age: 52
End: 2017-03-28

## 2017-03-28 VITALS
HEIGHT: 65 IN | OXYGEN SATURATION: 98 % | WEIGHT: 199.6 LBS | BODY MASS INDEX: 33.26 KG/M2 | SYSTOLIC BLOOD PRESSURE: 135 MMHG | HEART RATE: 71 BPM | RESPIRATION RATE: 16 BRPM | DIASTOLIC BLOOD PRESSURE: 78 MMHG | TEMPERATURE: 96.8 F

## 2017-03-28 VITALS
HEIGHT: 65 IN | HEART RATE: 83 BPM | BODY MASS INDEX: 33.26 KG/M2 | SYSTOLIC BLOOD PRESSURE: 133 MMHG | OXYGEN SATURATION: 95 % | WEIGHT: 199.6 LBS | RESPIRATION RATE: 16 BRPM | DIASTOLIC BLOOD PRESSURE: 85 MMHG | TEMPERATURE: 97.3 F

## 2017-03-28 DIAGNOSIS — Z79.899 ENCOUNTER FOR MONITORING CARDIOTOXIC DRUG THERAPY: ICD-10-CM

## 2017-03-28 DIAGNOSIS — R63.5 WEIGHT GAIN: ICD-10-CM

## 2017-03-28 DIAGNOSIS — C50.912 MALIGNANT NEOPLASM OF LEFT FEMALE BREAST, UNSPECIFIED SITE OF BREAST: Primary | ICD-10-CM

## 2017-03-28 DIAGNOSIS — G62.9 NEUROPATHY: ICD-10-CM

## 2017-03-28 DIAGNOSIS — Z51.81 ENCOUNTER FOR MONITORING CARDIOTOXIC DRUG THERAPY: ICD-10-CM

## 2017-03-28 DIAGNOSIS — J01.01 ACUTE RECURRENT MAXILLARY SINUSITIS: ICD-10-CM

## 2017-03-28 PROCEDURE — 74011250636 HC RX REV CODE- 250/636: Performed by: INTERNAL MEDICINE

## 2017-03-28 PROCEDURE — 77030012965 HC NDL HUBR BBMI -A

## 2017-03-28 PROCEDURE — 96413 CHEMO IV INFUSION 1 HR: CPT

## 2017-03-28 PROCEDURE — 74011250636 HC RX REV CODE- 250/636

## 2017-03-28 PROCEDURE — 74011000250 HC RX REV CODE- 250

## 2017-03-28 PROCEDURE — 74011250637 HC RX REV CODE- 250/637: Performed by: INTERNAL MEDICINE

## 2017-03-28 RX ORDER — SODIUM CHLORIDE 9 MG/ML
10 INJECTION INTRAMUSCULAR; INTRAVENOUS; SUBCUTANEOUS AS NEEDED
Status: ACTIVE | OUTPATIENT
Start: 2017-03-28 | End: 2017-03-29

## 2017-03-28 RX ORDER — HEPARIN 100 UNIT/ML
500 SYRINGE INTRAVENOUS AS NEEDED
Status: ACTIVE | OUTPATIENT
Start: 2017-03-28 | End: 2017-03-29

## 2017-03-28 RX ORDER — SODIUM CHLORIDE 0.9 % (FLUSH) 0.9 %
10 SYRINGE (ML) INJECTION AS NEEDED
Status: ACTIVE | OUTPATIENT
Start: 2017-03-28 | End: 2017-03-29

## 2017-03-28 RX ADMIN — SODIUM CHLORIDE 10 ML: 9 INJECTION, SOLUTION INTRAMUSCULAR; INTRAVENOUS; SUBCUTANEOUS at 11:20

## 2017-03-28 RX ADMIN — SODIUM CHLORIDE 25 ML/HR: 900 INJECTION, SOLUTION INTRAVENOUS at 13:00

## 2017-03-28 RX ADMIN — Medication 10 ML: at 14:40

## 2017-03-28 RX ADMIN — Medication 540 MG: at 13:13

## 2017-03-28 RX ADMIN — SODIUM CHLORIDE, PRESERVATIVE FREE 500 UNITS: 5 INJECTION INTRAVENOUS at 14:45

## 2017-03-28 RX ADMIN — DIPHENHYDRAMINE HYDROCHLORIDE 50 MG: 25 CAPSULE ORAL at 13:10

## 2017-03-28 NOTE — PROGRESS NOTES
[facility] VISIT NOTE    1115 hrs  Pt arrived at [facility] ambulatory and in no distress for Herceptin C6  Assessment completed, pt c/o nasal congestion; pt has hx of sinus issues along with surgery; voiced displeasure with Arimidex and believes is the cause of sinus trouble. RCW  accessed with . 75 in [place] with no difficulty. Positive blood return noted. Pt proceeded to MD visit. Medications received:  Diphenhydramine PO  Herceptin IV    Tolerated treatment well, no adverse reaction noted. Port de-accessed and flushed per protocol. Positive blood return noted. Patient Vitals for the past 12 hrs:   Temp Pulse Resp BP SpO2   03/28/17 1445 96.8 °F (36 °C) 71 16 135/78 98 %   03/28/17 1122 97.3 °F (36.3 °C) 83 16 133/85 95 %       1450 hrs   D/C'd from [facility] ambulatory and in no distress.  Next appointment is 05/02/17 @ 9 am.

## 2017-03-28 NOTE — MR AVS SNAPSHOT
Visit Information Date & Time Provider Department Dept. Phone Encounter #  
 3/28/2017 11:15 AM Yahaira Catalan at [address] [place] [address] Rd [id number] Follow-up Instructions Return in about 5 weeks (around 5/2/2017) for fu, sand/mikael, patricia her 6. Upcoming Health Maintenance Date Due Pneumococcal 19-64 Highest Risk (1 of 3 - PCV13) 2/15/1985 DTaP/Tdap/Td series (1 - Tdap) 2/15/1987 FOBT Q 1 YEAR AGE 50-75 2/15/2016 INFLUENZA AGE 9 TO ADULT 8/1/2016 BREAST CANCER SCRN MAMMOGRAM 2/12/2018 PAP AKA CERVICAL CYTOLOGY 6/1/2018 Allergies as of 3/28/2017  Review Complete On: 3/28/2017 By: Glenn Berkowitz NP Severity Noted Reaction Type Reactions Augmentin [Amoxicillin-pot Clavulanate]  08/29/2013    Nausea and Vomiting Projectile vomiting, sweats, profuse diarrhea Gluten  09/24/2014    Other (comments)  
 insensitity Other Medication  06/24/2013    Hives H2 antagonists,   
  
Current Immunizations  Reviewed on 2/28/2017 No immunizations on file. Not reviewed this visit You Were Diagnosed With   
  
 Codes Comments Malignant neoplasm of left female breast, unspecified site of breast ([facility] [address] [address]    -  Primary ICD-10-CM: G85.886 ICD-9-CM: 174.9 Neuropathy     ICD-10-CM: G62.9 ICD-9-CM: 355.9 Weight gain     ICD-10-CM: R63.5 ICD-9-CM: 783.1 Acute recurrent maxillary sinusitis     ICD-10-CM: J01.01 
ICD-9-CM: 461.0 Encounter for monitoring cardiotoxic drug therapy     ICD-10-CM: Z51.81, Z79.899 ICD-9-CM: V58.83, V58.69 Vitals BP Pulse Temp Resp Height(growth percentile) Weight(growth percentile) 133/85 83 97.3 °F (36.3 °C) (Temporal) 16 5' 5\" (1.651 m) 199 lb 9.6 oz (90.5 kg) SpO2 BMI OB Status Smoking Status 95% 33.22 kg/m2 Menopause Former Smoker Vitals History BMI and BSA Data  Body Mass Index Body Surface Area  
 33.22 kg/m 2 2.04 m 2  
  
 Preferred Pharmacy Pharmacy Name Phone [id number] [address], [address] [phone] Your Updated Medication List  
  
   
This list is accurate as of: 3/28/17  1:01 PM.  Always use your most recent med list. ADVIL 200 mg tablet Generic drug:  ibuprofen Take 200 mg by mouth. anastrozole 1 mg tablet Commonly known as:  ARIMIDEX Take 1 Tab by mouth daily. Biotin 2,500 mcg Cap Take  by mouth daily. diazePAM 2 mg tablet Commonly known as:  VALIUM Take 4 mg by mouth nightly as needed. lidocaine-prilocaine topical cream  
Commonly known as:  EMLA Apply  to affected area as needed for Pain. MULTIPLE VITAMIN PO Take  by mouth daily. ondansetron 4 mg disintegrating tablet Commonly known as:  ZOFRAN ODT Take 1 Tab by mouth every eight (8) hours as needed for Nausea. PROBIOTIC PO Take  by mouth daily. protein Powd Take  by mouth daily. VITAMIN B-12 PO Take  by mouth daily. VITAMIN D3 PO Take 1,000 Units by mouth daily. Follow-up Instructions Return in about 5 weeks (around 5/2/2017) for fu, sand/patricia youssef her 6. To-Do List   
 04/18/2017 1:00 PM  
  Appointment with [address] at [place] [address] ([phone])  
  
 05/01/2017 ECHO:  2D ECHO COMPLETE ADULT (TTE) W OR WO CONTR   
  
 05/02/2017 9:00 AM  
  Appointment with [address] at [place] [address] ([phone]) Patient Instructions Come see us in 3 weeks. Introducing \[facility] [place] [facility]\"" & HEALTH SERVICES! Dimple Jaramillo introduces [employer] patient portal. Now you can access parts of your medical record, email your doctor's office, and request medication refills online. 1. In your internet browser, go to [email or web address] 2. Click on the First Time User? Click Here link in the Sign In box. You will see the New Member Sign Up page. 3. Enter your [employer] Access Code exactly as it appears below. You will not need to use this code after youve completed the sign-up process. If you do not sign up before the expiration date, you must request a new code. · [employer] Access Code: [email or web address] Expires: 6/8/2017  4:31 PM 
 
4. Enter the last four digits of your Social Security Number (xxxx) and Date of Birth (mm/dd/yyyy) as indicated and click Submit. You will be taken to the next sign-up page. 5. Create a [employer] ID. This will be your [employer] login ID and cannot be changed, so think of one that is secure and easy to remember. 6. Create a [employer] password. You can change your password at any time. 7. Enter your Password Reset Question and Answer. This can be used at a later time if you forget your password. 8. Enter your e-mail address. You will receive e-mail notification when new information is available in [address] 9. Click Sign Up. You can now view and download portions of your medical record. 10. Click the Download Summary menu link to download a portable copy of your medical information. If you have questions, please visit the Frequently Asked Questions section of the [employer] website. Remember, [employer] is NOT to be used for urgent needs. For medical emergencies, dial 911. Now available from your iPhone and Android! Please provide this summary of care documentation to your next provider. Your primary care clinician is listed as Allie May. If you have any questions after today's visit, please call [phone].

## 2017-03-28 NOTE — PROGRESS NOTES
Problem: Anxiety  Goal: *Alleviation of anxiety  Outcome: Progressing Towards Goal  Pt presents with rapid speech and teary eyed; reports that ongoing nasal congestion is r/t Arimidex and she will no longer take it. Encouraged pt to discuss feelings; discussed coping mechanisms such as deep breathing exercises. Pt to discuss desire to find alternative intervention to Arimidex. By end of treatment pt appeared rested; mood and affect congruent to situation.

## 2017-03-28 NOTE — PROGRESS NOTES
[address], [address] [facility] [place], [place] [address]  W: [phone]  F: [phone]     f/u HEME/ONC CONSULT    Reason for visit: evaluation for treatment for    Breast Cancer    Consulting physicians:  Dr. Dori Coates; Dr. Gaby Hameed    HPI:   Meek Stratton is a 49 y.o.  female who is transferring her care for management of breast cancer. 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 3/9/16 left lumpectomy showed 1.3 cm of IDC, ER + at 82%, OR negative, HER 2 positive (IHC 3+; ratio 5; sig/cell 7.5),  0/2 LN involved, ki67 68%, gr 3, multiple satelite nodules up to 4 mm. [id number]. Was treated at [address] [place] [address] with Dr. Evie Warren. Received TH as in APT x 12 weeks (took 14 weeks due to neutropenia). Taxol was ended up DR by 30%. 4/22/16 this started, ended 7/22/16. Did have stomatitis with the outback herceptin. Started 8/19/16    S/p XRT 10/7/16    Did have a bone density study, normal per pt -- done by Dr. Maddie Black. Anastrozole: 1/2017-3/17/17, stopped due to sinus issues    Interval history:  In today for outback herceptin. Complains of gr 1 loss of appetite, gr 2 bleeding, gr 1 constipation, gr 1 fatigue, gr 1 chills, gr 1 hot flashes, gr 1 insomnia, gr 2 anxiety, gr 1 neuropathy, gr 1 swelling, gr 1 headache, gr 1 incontinence, gr 1 libido. She stopper her anastrozole due to sinus issues. Is walking 3-4 hours a day. DX   Encounter Diagnosis   Name Primary?     Malignant neoplasm of left female breast, unspecified site of breast ([facility] [address] Yes      Past Medical History   Diagnosis Date    Abnormal Pap smear 1990s     cryotherapy, no abnormals since    Contact dermatitis and other eczema, due to unspecified cause     Depression     Endometriosis     Malignant neoplasm of left female breast ([facility] [address] 3/28/2016    Oligomenorrhea      currently not having menstrual cycles X 3 months     Past Surgical History   Procedure Laterality Date    Hx heent       scarring from sinusitis.  Hx gyn       cervical cryotherapy      Social History     Social History    Marital status:      Spouse name: N/A    Number of children: 1    Years of education: N/A     Occupational History     [employer]      Social History Main Topics    Smoking status: Former Smoker    Smokeless tobacco: None    Alcohol use 1.0 oz/week     2 Cans of beer per week    Drug use: None    Sexual activity: Not Currently     Partners: Male     Birth control/ protection: Condom     Other Topics Concern    None     Social History Narrative     History reviewed. No pertinent family history. Current Outpatient Prescriptions   Medication Sig Dispense Refill    protein powd Take  by mouth daily.  Biotin 2,500 mcg cap Take  by mouth daily.  diazepam (VALIUM) 2 mg tablet Take 2 mg by mouth nightly as needed. 3    CYANOCOBALAMIN, VITAMIN B-12, (VITAMIN B-12 PO) Take  by mouth daily.  CHOLECALCIFEROL, VITAMIN D3, (VITAMIN D3 PO) Take 1,000 Units by mouth daily.  LACTOBACILLUS ACIDOPHILUS (PROBIOTIC PO) Take  by mouth daily.  MULTIVITAMIN (MULTIPLE VITAMIN PO) Take  by mouth daily.  anastrozole (ARIMIDEX) 1 mg tablet Take 1 Tab by mouth daily. 90 Tab 3    lidocaine-prilocaine (EMLA) topical cream Apply  to affected area as needed for Pain. 30 g 0    ibuprofen (ADVIL) 200 mg tablet Take 200 mg by mouth.  ondansetron (ZOFRAN ODT) 4 mg disintegrating tablet Take 1 Tab by mouth every eight (8) hours as needed for Nausea.  20 Tab 0     Facility-Administered Medications Ordered in Other Visits   Medication Dose Route Frequency Provider Last Rate Last Dose    sodium chloride 0.9 % injection 10 mL  10 mL IntraVENous PRN Anne Alicea MD        heparin (porcine) pf 500 Units  500 Units IntraVENous PRN Anne Alicea MD        sodium chloride (NS) flush 10-40 mL  10-40 mL IntraVENous PRN John Crabtree MD        0.9% sodium chloride infusion  25 mL/hr IntraVENous CONTINUOUS John Crabtree MD        diphenhydrAMINE (BENADRYL) capsule 50 mg  50 mg Oral ONCE John Crabtree MD        trastuzumab (HERCEPTIN) 540 mg in 0.9% sodium chloride 250 mL, overfill volume 25 mL IVPB  540 mg IntraVENous ONCE John Crabtree MD         Allergies   Allergen Reactions    Augmentin [Amoxicillin-Pot Clavulanate] Nausea and Vomiting     Projectile vomiting, sweats, profuse diarrhea    Gluten Other (comments)     insensitity    Other Medication Hives     H2 antagonists,      Review of Systems    A comprehensive review of systems was performed and all systems were negative except for HPI and for the symptom review form, reviewed and scanned in.    Objective:  Visit Vitals    /84    Pulse 83    Temp 97.3 °F (36.3 °C) (Temporal)    Resp 18    Ht 5' 5\" (1.651 m)    Wt 201 lb 9.6 oz (91.4 kg)    SpO2 99%    BMI 33.55 kg/m2         General:  Alert, cooperative, no distress, appears stated age. Head:  Normocephalic, without obvious abnormality, atraumatic. Eyes:  Conjunctivae/corneas clear. PERRL, EOMs intact. Throat: Lips, mucosa, and tongue normal.    Neck: Supple, symmetrical, trachea midline, no adenopathy, thyroid: no enlargement/tenderness/nodules   Back:   Symmetric, no curvature. ROM normal. No CVA tenderness. Lungs:   Clear to auscultation bilaterally. Chest wall:  No tenderness or deformity. Heart:  Regular rate and rhythm, S1, S2 normal, no murmur, click, rub or gallop. Abdomen:   Soft, non-tender. Bowel sounds normal. No masses,  No organomegaly. Extremities: Extremities normal, atraumatic, no cyanosis or edema. Skin: Skin color, texture, turgor normal. No rashes or lesions. Neurologic: CNII-XII intact. Diagnostic Imaging   No results found for this or any previous visit.     Lab Results  Lab Results   Component Value Date/Time    WBC 6.5 10/03/2014 03:36 PM HGB 13.6 10/03/2014 03:36 PM    HCT 41.2 10/03/2014 03:36 PM    PLATELET 658 57/94/4442 03:36 PM    MCV 91 10/03/2014 03:36 PM     Lab Results   Component Value Date/Time    Sodium 142 11/14/2016 04:12 PM    Potassium 3.8 11/14/2016 04:12 PM    Chloride 105 11/14/2016 04:12 PM    CO2 32 11/14/2016 04:12 PM    Anion gap 5 11/14/2016 04:12 PM    Glucose 111 11/14/2016 04:12 PM    BUN 10 11/14/2016 04:12 PM    Creatinine 0.77 11/14/2016 04:12 PM    BUN/Creatinine ratio 13 11/14/2016 04:12 PM    GFR est AA >60 11/14/2016 04:12 PM    GFR est non-AA >60 11/14/2016 04:12 PM    Calcium 8.5 11/14/2016 04:12 PM     Assessment/Plan:  49 y.o. female with L IDC, ER +, MS negative, HER 2 +, 1.3 cm, gr 3, 0/2 LN involved. PS 0    1. Left Breast cancer stage: IA    Hormonal therapy: administered    We explained to the patient that the goal of systemic adjuvant therapy is to improve the chances for cure and decrease the risk of relapse. We explained why a patient can have microscopic cancer spread now even though physical examination, laboratory studies and imaging studies are negative for cancer. We explained that the same treatments used now as adjuvant or preventive treatments rarely if ever are curative in women who develop metastases. Using PREDICT!, without therapy, her 10 year OS is 79%; with therapy that increases to 89%, endocrine therapy is 5% of that. Rationale for therapy with trastuzumab was also discussed with the patient including a 50% proportional improvement in disease free survival and also an improvement in overall survival in patients receiving trastuzumab and chemotherapy for HER-2 positive breast cancer. The side effects of trastuzumab were discussed including a 4%-5% risk of dropping her ejection fraction while on treatment and about a 1% risk of CHF. We discussed that this drug will be used every 3 weeks for remainder of a year following the chemotherapy cycles.  We will check her EF before chemotherapy and every 3 months while she is receiving trastuzumab. Informed consent signed. She feels that she is still cycling, but not having periods. FSH, LH, and estradiol reviewed from 6/1/15, appears postmenopausal, but estradiol not < 6, but close. Checked her [address], LH, estradiol in 12/2016 which confirmed that she is postmenopausal. Has not had a period in 3 years. She has stopped Anastrozole as she feels that it is drying out her sinuses. The risks and benefits of tamoxifen were discussed in detail and the patient was informed of the following: Risks include a 1% risk of endometrial cancer for postmenopausal women treated for five years but no (or a minimally increased) risk in premenopausal women and that most women who develop tamoxifen-associated endometrial cancer can be cured. Any bleeding in a postmenopausal woman should be reported to a health care professional. There is also a 1% risk of blood clots (thromboembolism) that can be fatal. All patients irrespective of age who take tamoxifen and who have not had a hysterectomy should have a pelvic exam and Pap smear yearly. Tamoxifen increases the risk of cataract formation and on rare occasions has caused retinal damage: an eye exam is recommended yearly. Other risks include vaginal discharge or dryness, the development or worsening of hot flashes or vasomotor symptoms, and bone loss in premenopausal women. There is excellent evidence that tamoxifen does not increase risk of depression, cause weight gain or have a major effect on sexual function. Available data suggests little or no effect on cognitive function. Benefits include a lowering of cholesterol and a reduction in the rate of bone loss for postmenopausal woman. Any other symptoms should be reported. After this discussion, she wishes to not take any endocrine therapy at this time. I informed her of the benefit that this provided in regards to breast cancer recurrence.  She verbalizes understanding. She wishes to readdress this once completed with her outback therapy. In today for outback Herceptin #5 will see her in 5 weeks for #6 due to her vacation. TTE on 2/2/17, EF 69%. Next due before 5/2/17, ordered. Eliel mammogram and breast MRI due at the end of March. She would prefer these at [address], we will have Dr. Mikel Jaffe order these. I recommend yearly mammogram and breast MRI. Planned for 4/6/17.    2. Emotional well being: She has excellent support and is coping well with her disease. 3. Side effects from trastuzumab: Including muscle pain, mouth sores, fatigue. Will monitor, pre med with benadryl. 4. 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 Mostly in fingers. Will monitor. 5. Weight gain/loss of appetite: She has a desire to get back down to her pre chemo weight. Anamika Sanchez RD has reached out to her. 6. Pharyngitis/Sinusitis: some improvement with stopping anastrozole. ENT, Dr. Collins Mortimer following. S/p z alonso and doxycyline. > 25 minutes were spent with this patient with > 50% of that time spent in face to face counseling.         Jessy Li MD

## 2017-04-04 ENCOUNTER — APPOINTMENT (OUTPATIENT)
Dept: INFUSION THERAPY | Age: 52
End: 2017-04-04
Payer: COMMERCIAL

## 2017-04-10 ENCOUNTER — CLINICAL SUPPORT (OUTPATIENT)
Dept: CARDIOLOGY CLINIC | Age: 52
End: 2017-04-10

## 2017-04-10 DIAGNOSIS — Z51.81 ENCOUNTER FOR MONITORING CARDIOTOXIC DRUG THERAPY: ICD-10-CM

## 2017-04-10 DIAGNOSIS — Z79.899 ENCOUNTER FOR MONITORING CARDIOTOXIC DRUG THERAPY: ICD-10-CM

## 2017-04-10 DIAGNOSIS — J01.01 ACUTE RECURRENT MAXILLARY SINUSITIS: ICD-10-CM

## 2017-04-10 DIAGNOSIS — R63.5 WEIGHT GAIN: ICD-10-CM

## 2017-04-10 DIAGNOSIS — G62.9 NEUROPATHY: ICD-10-CM

## 2017-04-10 DIAGNOSIS — C50.912 MALIGNANT NEOPLASM OF LEFT FEMALE BREAST, UNSPECIFIED SITE OF BREAST: ICD-10-CM

## 2017-04-11 ENCOUNTER — APPOINTMENT (OUTPATIENT)
Dept: INFUSION THERAPY | Age: 52
End: 2017-04-11

## 2017-04-13 RX ORDER — SODIUM CHLORIDE 9 MG/ML
25 INJECTION, SOLUTION INTRAVENOUS CONTINUOUS
Status: DISPENSED | OUTPATIENT
Start: 2017-04-18 | End: 2017-04-19

## 2017-04-13 RX ORDER — DIPHENHYDRAMINE HCL 25 MG
50 CAPSULE ORAL ONCE
Status: ACTIVE | OUTPATIENT
Start: 2017-04-18 | End: 2017-04-19

## 2017-04-18 ENCOUNTER — HOSPITAL ENCOUNTER (OUTPATIENT)
Dept: INFUSION THERAPY | Age: 52
Discharge: HOME | End: 2017-04-18

## 2017-04-18 RX ORDER — SODIUM CHLORIDE 0.9 % (FLUSH) 0.9 %
10 SYRINGE (ML) INJECTION AS NEEDED
Status: DISCONTINUED | OUTPATIENT
Start: 2017-04-18 | End: 2017-05-19 | Stop reason: HOSPADM

## 2017-04-18 RX ORDER — SODIUM CHLORIDE 9 MG/ML
10 INJECTION INTRAMUSCULAR; INTRAVENOUS; SUBCUTANEOUS AS NEEDED
Status: ACTIVE | OUTPATIENT
Start: 2017-04-18 | End: 2017-04-19

## 2017-04-18 RX ORDER — HEPARIN 100 UNIT/ML
500 SYRINGE INTRAVENOUS AS NEEDED
Status: ACTIVE | OUTPATIENT
Start: 2017-04-18 | End: 2017-04-19

## 2017-04-18 NOTE — PROGRESS NOTES
[facility] VISIT NOTE    ***  Pt arrived at [facility] ambulatory and in no distress for Trastuzumab C7. Assessment completed, pt c/o     *** chest port accessed with *** in [place] with no difficulty. Positive blood return noted and labs drawn. Medications received:  ***    Tolerated treatment well, no adverse reaction noted. Port de-accessed and flushed per protocol. Positive blood return noted. ***  D/C'd from [facility] ambulatory and in no distress accompanied by ***. Next appointment is 05/02/17 @ 0900 hrs.

## 2017-04-25 ENCOUNTER — APPOINTMENT (OUTPATIENT)
Dept: INFUSION THERAPY | Age: 52
End: 2017-04-25
Payer: COMMERCIAL

## 2017-04-27 RX ORDER — SODIUM CHLORIDE 9 MG/ML
25 INJECTION, SOLUTION INTRAVENOUS CONTINUOUS
Status: DISPENSED | OUTPATIENT
Start: 2017-05-02 | End: 2017-05-02

## 2017-04-27 RX ORDER — DIPHENHYDRAMINE HCL 25 MG
50 CAPSULE ORAL ONCE
Status: COMPLETED | OUTPATIENT
Start: 2017-05-02 | End: 2017-05-02

## 2017-05-02 ENCOUNTER — HOSPITAL ENCOUNTER (OUTPATIENT)
Dept: INFUSION THERAPY | Age: 52
Discharge: HOME | End: 2017-05-02
Payer: COMMERCIAL

## 2017-05-02 ENCOUNTER — APPOINTMENT (OUTPATIENT)
Dept: INFUSION THERAPY | Age: 52
End: 2017-05-02

## 2017-05-02 ENCOUNTER — OFFICE VISIT (OUTPATIENT)
Dept: ONCOLOGY | Age: 52
End: 2017-05-02

## 2017-05-02 VITALS
DIASTOLIC BLOOD PRESSURE: 87 MMHG | HEART RATE: 82 BPM | RESPIRATION RATE: 18 BRPM | SYSTOLIC BLOOD PRESSURE: 138 MMHG | OXYGEN SATURATION: 97 % | BODY MASS INDEX: 34.16 KG/M2 | HEIGHT: 65 IN | TEMPERATURE: 97.3 F | WEIGHT: 205 LBS

## 2017-05-02 VITALS
BODY MASS INDEX: 34.11 KG/M2 | RESPIRATION RATE: 18 BRPM | DIASTOLIC BLOOD PRESSURE: 84 MMHG | HEART RATE: 71 BPM | WEIGHT: 205 LBS | SYSTOLIC BLOOD PRESSURE: 129 MMHG | TEMPERATURE: 97.3 F

## 2017-05-02 DIAGNOSIS — R63.5 WEIGHT GAIN: ICD-10-CM

## 2017-05-02 DIAGNOSIS — G62.9 NEUROPATHY: ICD-10-CM

## 2017-05-02 DIAGNOSIS — R53.0 NEOPLASTIC MALIGNANT RELATED FATIGUE: ICD-10-CM

## 2017-05-02 DIAGNOSIS — C50.912 MALIGNANT NEOPLASM OF LEFT FEMALE BREAST, UNSPECIFIED SITE OF BREAST: Primary | ICD-10-CM

## 2017-05-02 PROCEDURE — 74011250637 HC RX REV CODE- 250/637: Performed by: INTERNAL MEDICINE

## 2017-05-02 PROCEDURE — 96413 CHEMO IV INFUSION 1 HR: CPT

## 2017-05-02 PROCEDURE — 74011000250 HC RX REV CODE- 250

## 2017-05-02 PROCEDURE — 74011250636 HC RX REV CODE- 250/636: Performed by: INTERNAL MEDICINE

## 2017-05-02 PROCEDURE — 74011250636 HC RX REV CODE- 250/636

## 2017-05-02 RX ORDER — SODIUM CHLORIDE 0.9 % (FLUSH) 0.9 %
10 SYRINGE (ML) INJECTION AS NEEDED
Status: ACTIVE | OUTPATIENT
Start: 2017-05-02 | End: 2017-05-03

## 2017-05-02 RX ORDER — HEPARIN 100 UNIT/ML
500 SYRINGE INTRAVENOUS AS NEEDED
Status: ACTIVE | OUTPATIENT
Start: 2017-05-02 | End: 2017-05-03

## 2017-05-02 RX ORDER — SODIUM CHLORIDE 9 MG/ML
10 INJECTION INTRAMUSCULAR; INTRAVENOUS; SUBCUTANEOUS AS NEEDED
Status: DISPENSED | OUTPATIENT
Start: 2017-05-02 | End: 2017-05-03

## 2017-05-02 RX ADMIN — HEPARIN SODIUM (PORCINE) LOCK FLUSH IV SOLN 100 UNIT/ML 500 UNITS: 100 SOLUTION at 13:15

## 2017-05-02 RX ADMIN — Medication 10 ML: at 13:15

## 2017-05-02 RX ADMIN — DIPHENHYDRAMINE HYDROCHLORIDE 50 MG: 25 CAPSULE ORAL at 11:23

## 2017-05-02 RX ADMIN — SODIUM CHLORIDE 25 ML/HR: 900 INJECTION, SOLUTION INTRAVENOUS at 11:55

## 2017-05-02 RX ADMIN — Medication 540 MG: at 12:00

## 2017-05-02 RX ADMIN — SODIUM CHLORIDE 10 ML: 9 INJECTION INTRAMUSCULAR; INTRAVENOUS; SUBCUTANEOUS at 09:52

## 2017-05-02 RX ADMIN — Medication 10 ML: at 09:53

## 2017-05-02 NOTE — PROGRESS NOTES
[facility]  [address] [facility], [address] [facility]  [place], [place] [address]  W: [phone]  F: [phone]     f/u HEME/ONC CONSULT    Reason for visit: evaluation for treatment for    Breast Cancer    Consulting physicians:  Dr. Noam Cavanaugh; Dr. Jensen Martinez    HPI:   Lauren Carbone is a 49 y.o.  female who is transferring her care for management of breast cancer. 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 3/9/16 left lumpectomy showed 1.3 cm of IDC, ER + at 82%, IL negative, HER 2 positive (IHC 3+; ratio 5; sig/cell 7.5),  0/2 LN involved, ki67 68%, gr 3, multiple satelite nodules up to 4 mm. [id number]. Was treated at [address] [place] [address] with Dr. Tim Sen. Received TH as in APT x 12 weeks (took 14 weeks due to neutropenia). Taxol was ended up DR by 30%. 4/22/16 this started, ended 7/22/16. Did have stomatitis with the outback herceptin. Started 8/19/16    S/p XRT 10/7/16    Did have a bone density study, normal per pt -- done by Dr. Angelica Martinez. Anastrozole: 1/2017-3/17/17, stopped due to sinus issues    Interval history:  In today for outback herceptin. Complains of gr 1 loss of appetite, gr 1 constipation, gr 1 fatigue, gr 1 chills, gr 1 hot flashes, gr 1 insomnia, gr 1-2 anxiety, gr 1 neuropathy, gr 1 swelling, gr 1 headache, gr 1-2 incontinence, gr 1 libido, gr 1 vaginal dryness. She is still holding the anastrozole at this time. She had a nice vacation. Is walking 3-4 hours a day and has started to play tennis again. DX   Encounter Diagnosis   Name Primary?     Malignant neoplasm of left female breast, unspecified site of breast ([facility] [address] Yes      Past Medical History   Diagnosis Date    Abnormal Pap smear 1990s     cryotherapy, no abnormals since    Contact dermatitis and other eczema, due to unspecified cause     Depression     Endometriosis     Malignant neoplasm of left female breast ([facility] [address] 3/28/2016    Oligomenorrhea      currently not having menstrual cycles X 3 months     Past Surgical History   Procedure Laterality Date    Hx heent       scarring from sinusitis.  Hx gyn       cervical cryotherapy      Social History     Social History    Marital status:      Spouse name: N/A    Number of children: 1    Years of education: N/A     Occupational History     [employer]      Social History Main Topics    Smoking status: Former Smoker    Smokeless tobacco: None    Alcohol use 1.0 oz/week     2 Cans of beer per week    Drug use: None    Sexual activity: Not Currently     Partners: Male     Birth control/ protection: Condom     Other Topics Concern    None     Social History Narrative     History reviewed. No pertinent family history. Current Outpatient Prescriptions   Medication Sig Dispense Refill    protein powd Take  by mouth daily.  Biotin 2,500 mcg cap Take  by mouth daily.  diazepam (VALIUM) 2 mg tablet Take 2 mg by mouth nightly as needed. 3    CYANOCOBALAMIN, VITAMIN B-12, (VITAMIN B-12 PO) Take  by mouth daily.  CHOLECALCIFEROL, VITAMIN D3, (VITAMIN D3 PO) Take 1,000 Units by mouth daily.  LACTOBACILLUS ACIDOPHILUS (PROBIOTIC PO) Take  by mouth daily.  MULTIVITAMIN (MULTIPLE VITAMIN PO) Take  by mouth daily.  anastrozole (ARIMIDEX) 1 mg tablet Take 1 Tab by mouth daily. 90 Tab 3    lidocaine-prilocaine (EMLA) topical cream Apply  to affected area as needed for Pain. 30 g 0    ibuprofen (ADVIL) 200 mg tablet Take 200 mg by mouth.  ondansetron (ZOFRAN ODT) 4 mg disintegrating tablet Take 1 Tab by mouth every eight (8) hours as needed for Nausea.  20 Tab 0     Facility-Administered Medications Ordered in Other Visits   Medication Dose Route Frequency Provider Last Rate Last Dose    sodium chloride 0.9 % injection 10 mL  10 mL IntraVENous PRN Nikolas Fox MD        heparin (porcine) pf 500 Units  500 Units IntraVENous PRN Arie Million Vickie Anguiano MD        sodium chloride (NS) flush 10-40 mL  10-40 mL IntraVENous PRN Joanne Hernandez MD        0.9% sodium chloride infusion  25 mL/hr IntraVENous CONTINUOUS Joanne Hernandez MD        diphenhydrAMINE (BENADRYL) capsule 50 mg  50 mg Oral ONCE Joanne Hernandez MD        trastuzumab (HERCEPTIN) 540 mg in 0.9% sodium chloride 250 mL, overfill volume 25 mL IVPB  540 mg IntraVENous ONCE Joanne Hernandez MD         Allergies   Allergen Reactions    Augmentin [Amoxicillin-Pot Clavulanate] Nausea and Vomiting     Projectile vomiting, sweats, profuse diarrhea    Gluten Other (comments)     insensitity    Other Medication Hives     H2 antagonists,      Review of Systems    A comprehensive review of systems was performed and all systems were negative except for HPI and for the symptom review form, reviewed and scanned in.    Objective:  Visit Vitals    /84    Pulse 83    Temp 97.3 °F (36.3 °C) (Temporal)    Resp 18    Ht 5' 5\" (1.651 m)    Wt 201 lb 9.6 oz (91.4 kg)    SpO2 99%    BMI 33.55 kg/m2         General:  Alert, cooperative, no distress, appears stated age. Head:  Normocephalic, without obvious abnormality, atraumatic. Eyes:  Conjunctivae/corneas clear. PERRL, EOMs intact. Throat: Lips, mucosa, and tongue normal.    Neck: Supple, symmetrical, trachea midline, no adenopathy, thyroid: no enlargement/tenderness/nodules   Back:   Symmetric, no curvature. ROM normal. No CVA tenderness. Lungs:   Clear to auscultation bilaterally. Chest wall:  No tenderness or deformity. Heart:  Regular rate and rhythm, S1, S2 normal, no murmur, click, rub or gallop. Abdomen:   Soft, non-tender. Bowel sounds normal. No masses,  No organomegaly. Extremities: Extremities normal, atraumatic, no cyanosis or edema. Skin: Skin color, texture, turgor normal. No rashes or lesions. Neurologic: CNII-XII intact. Diagnostic Imaging   No results found for this or any previous visit.     Lab Results  Lab Results   Component Value Date/Time    WBC 6.5 10/03/2014 03:36 PM    HGB 13.6 10/03/2014 03:36 PM    HCT 41.2 10/03/2014 03:36 PM    PLATELET 187 38/17/1913 03:36 PM    MCV 91 10/03/2014 03:36 PM     Lab Results   Component Value Date/Time    Sodium 142 11/14/2016 04:12 PM    Potassium 3.8 11/14/2016 04:12 PM    Chloride 105 11/14/2016 04:12 PM    CO2 32 11/14/2016 04:12 PM    Anion gap 5 11/14/2016 04:12 PM    Glucose 111 11/14/2016 04:12 PM    BUN 10 11/14/2016 04:12 PM    Creatinine 0.77 11/14/2016 04:12 PM    BUN/Creatinine ratio 13 11/14/2016 04:12 PM    GFR est AA >60 11/14/2016 04:12 PM    GFR est non-AA >60 11/14/2016 04:12 PM    Calcium 8.5 11/14/2016 04:12 PM     Assessment/Plan:  49 y.o. female with L IDC, ER +, NM negative, HER 2 +, 1.3 cm, gr 3, 0/2 LN involved. PS 0    1. Left Breast cancer stage: IA    Hormonal therapy: administered    We explained to the patient that the goal of systemic adjuvant therapy is to improve the chances for cure and decrease the risk of relapse. We explained why a patient can have microscopic cancer spread now even though physical examination, laboratory studies and imaging studies are negative for cancer. We explained that the same treatments used now as adjuvant or preventive treatments rarely if ever are curative in women who develop metastases. Using PREDICT!, without therapy, her 10 year OS is 79%; with therapy that increases to 89%, endocrine therapy is 5% of that. Rationale for therapy with trastuzumab was also discussed with the patient including a 50% proportional improvement in disease free survival and also an improvement in overall survival in patients receiving trastuzumab and chemotherapy for HER-2 positive breast cancer. The side effects of trastuzumab were discussed including a 4%-5% risk of dropping her ejection fraction while on treatment and about a 1% risk of CHF.   We discussed that this drug will be used every 3 weeks for remainder of a year following the chemotherapy cycles. We will check her EF before chemotherapy and every 3 months while she is receiving trastuzumab. Informed consent signed. She feels that she is still cycling, but not having periods. FSH, LH, and estradiol reviewed from 6/1/15, appears postmenopausal, but estradiol not < 6, but close. Checked her [address], LH, estradiol in 12/2016 which confirmed that she is postmenopausal. Has not had a period in 3 years. She has stopped Anastrozole as she feels that it is drying out her sinuses. The risks and benefits of tamoxifen were discussed in detail and the patient was informed of the following: Risks include a 1% risk of endometrial cancer for postmenopausal women treated for five years but no (or a minimally increased) risk in premenopausal women and that most women who develop tamoxifen-associated endometrial cancer can be cured. Any bleeding in a postmenopausal woman should be reported to a health care professional. There is also a 1% risk of blood clots (thromboembolism) that can be fatal. All patients irrespective of age who take tamoxifen and who have not had a hysterectomy should have a pelvic exam and Pap smear yearly. Tamoxifen increases the risk of cataract formation and on rare occasions has caused retinal damage: an eye exam is recommended yearly. Other risks include vaginal discharge or dryness, the development or worsening of hot flashes or vasomotor symptoms, and bone loss in premenopausal women. There is excellent evidence that tamoxifen does not increase risk of depression, cause weight gain or have a major effect on sexual function. Available data suggests little or no effect on cognitive function. Benefits include a lowering of cholesterol and a reduction in the rate of bone loss for postmenopausal woman. Any other symptoms should be reported. After this discussion, she wishes to not take any endocrine therapy at this time.  I informed her of the benefit that this provided in regards to breast cancer recurrence. She verbalizes understanding. She wishes to readdress this once completed with her outback therapy. In today for outback Herceptin #6 will see her in 6 weeks for #8. TTE on 2/2/17, EF 69%. TTE on 4/10/17, EF 63%. Eliel mammogram and breast MRI in April with Dr. Ana Marques, negative per patient. I will reach out to Dr. Ana Marques to discuss getting a mammogram and MRI at the same time each year. 2. Emotional well being: She has excellent support and is coping well with her disease. 3. Side effects from trastuzumab: Including muscle pain, mouth sores, fatigue. Will monitor, pre med with benadryl. 4. 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 Mostly in fingers. Will monitor. 5. Weight gain/loss of appetite: She has a desire to get back down to her pre chemo weight. Deshaun Adams RD has reached out to her. 6. Pharyngitis/Sinusitis: some improvement with stopping anastrozole. ENT, Dr. Lucio Varma following. S/p z alonso and doxycyline. > 25 minutes were spent with this patient with > 50% of that time spent in face to face counseling.         Gia Madrigal MD

## 2017-05-02 NOTE — PROGRESS NOTES
[facility] VISIT NOTE    0924  Pt arrived at [facility] ambulatory and in no distress for C7 Herceptin. Last echo done 4/10/17 with EF 63%. Assessment completed, pt c/o \"holding onto fluids\", but denies SOB, chest pain and there is no visible swelling in her extremities. Blood pressure 138/87, pulse 82, temperature 97.3 °F (36.3 °C), resp. rate 16, weight 93 kg (205 lb). Right chest port accessed with 0.75 in [place] no difficulty by Lucy Diaz RN. Positive blood return noted. Patient proceeded to MD clinic visit. Medications received:  Benadryl PO  NS IV  Herceptin IV    Blood pressure 129/84, pulse 71, temperature 97.3 °F (36.3 °C), resp. rate 18, weight 93 kg (205 lb). Tolerated treatment well, no adverse reaction noted. Port de-accessed and flushed per protocol. Positive blood return noted. 1315  D/C'd from [facility] ambulatory and in no distress.  Next appointment is 5/23/17 at 11am.

## 2017-05-02 NOTE — MR AVS SNAPSHOT
Visit Information Date & Time Provider Department Dept. Phone Encounter #  
 5/2/2017  9:45 AM Jennifer Melendez NP [facility] at [address] [place] [address] Rd [id number] Follow-up Instructions Return in about 6 weeks (around 6/13/2017) for labs, sand/mikael, opic her 8. Upcoming Health Maintenance Date Due Pneumococcal 19-64 Highest Risk (1 of 3 - PCV13) 2/15/1985 DTaP/Tdap/Td series (1 - Tdap) 2/15/1987 FOBT Q 1 YEAR AGE 50-75 2/15/2016 INFLUENZA AGE 9 TO ADULT 8/1/2017 BREAST CANCER SCRN MAMMOGRAM 2/12/2018 PAP AKA CERVICAL CYTOLOGY 6/1/2018 Allergies as of 5/2/2017  Review Complete On: 5/2/2017 By: Jennifer Melendez NP Severity Noted Reaction Type Reactions Augmentin [Amoxicillin-pot Clavulanate]  08/29/2013    Nausea and Vomiting Projectile vomiting, sweats, profuse diarrhea Gluten  09/24/2014    Other (comments)  
 insensitity Other Medication  06/24/2013    Hives H2 antagonists,   
  
Current Immunizations  Reviewed on 5/2/2017 No immunizations on file. Reviewed by Daphne Lynch RN on 5/2/2017 at  9:43 AM  
You Were Diagnosed With   
  
 Codes Comments Malignant neoplasm of left female breast, unspecified site [facility]    -  Primary ICD-10-CM: N73.085 ICD-9-CM: 174.9 Neuropathy     ICD-10-CM: G62.9 ICD-9-CM: 355.9 Weight gain     ICD-10-CM: R63.5 ICD-9-CM: 783.1 Neoplastic malignant related fatigue     ICD-10-CM: R53.0 ICD-9-CM: 780.79 Vitals BP Pulse Temp Resp Height(growth percentile) Weight(growth percentile) 138/87 82 97.3 °F (36.3 °C) (Temporal) 18 5' 5\" (1.651 m) 205 lb (93 kg) SpO2 BMI OB Status Smoking Status 97% 34.11 kg/m2 Menopause Former Smoker Vitals History BMI and BSA Data Body Mass Index Body Surface Area  
 34.11 kg/m 2 2.07 m 2 Preferred Pharmacy Pharmacy Name Phone [id number]- [address], [address] [phone] Your Updated Medication List  
  
   
This list is accurate as of: 5/2/17 10:55 AM.  Always use your most recent med list. ADVIL 200 mg tablet Generic drug:  ibuprofen Take 200 mg by mouth. anastrozole 1 mg tablet Commonly known as:  ARIMIDEX Take 1 Tab by mouth daily. Biotin 2,500 mcg Cap Take  by mouth daily. diazePAM 2 mg tablet Commonly known as:  VALIUM Take 4 mg by mouth nightly as needed. lidocaine-prilocaine topical cream  
Commonly known as:  EMLA Apply  to affected area as needed for Pain. MULTIPLE VITAMIN PO Take  by mouth daily. ondansetron 4 mg disintegrating tablet Commonly known as:  ZOFRAN ODT Take 1 Tab by mouth every eight (8) hours as needed for Nausea. PROBIOTIC PO Take  by mouth daily. protein Powd Take  by mouth daily. VITAMIN B-12 PO Take  by mouth daily. VITAMIN D3 PO Take 1,000 Units by mouth daily. Follow-up Instructions Return in about 6 weeks (around 6/13/2017) for labs, sand/mikael, opic her 8. To-Do List   
 05/23/2017 11:00 AM  
  Appointment with [address] at [place] [address] ([phone])  
  
 06/13/2017 11:00 AM  
  Appointment with [address] at [place] [address] ([phone])  
  
 07/05/2017 10:00 AM  
  Appointment with [address] at [place] [address] ([phone]) Introducing [facility]! Jace Kauffman introduces [employer] patient portal. Now you can access parts of your medical record, email your doctor's office, and request medication refills online. 1. In your internet browser, go to [email or web address]. [employer] 2. Click on the First Time User? Click Here link in the Sign In box. You will see the New Member Sign Up page. 3. Enter your [employer] Access Code exactly as it appears below.  You will not need to use this code after youve completed the sign-up process. If you do not sign up before the expiration date, you must request a new code. · [employer]et Access Code: [email or web address] Expires: 6/8/2017  4:31 PM 
 
4. Enter the last four digits of your Social Security Number (xxxx) and Date of Birth (mm/dd/yyyy) as indicated and click Submit. You will be taken to the next sign-up page. 5. Create a [employer]et ID. This will be your ex[employer]et login ID and cannot be changed, so think of one that is secure and easy to remember. 6. Create a [employer]et password. You can change your password at any time. 7. Enter your Password Reset Question and Answer. This can be used at a later time if you forget your password. 8. Enter your e-mail address. You will receive e-mail notification when new information is available in [address]. 9. Click Sign Up. You can now view and download portions of your medical record. 10. Click the Download Summary menu link to download a portable copy of your medical information. If you have questions, please visit the Frequently Asked Questions section of the [employer]et website. Remember, [employer]et is NOT to be used for urgent needs. For medical emergencies, dial 911. Now available from your iPhone and Android! Please provide this summary of care documentation to your next provider. Your primary care clinician is listed as Bria Ring. If you have any questions after today's visit, please call [phone].

## 2017-05-09 ENCOUNTER — APPOINTMENT (OUTPATIENT)
Dept: INFUSION THERAPY | Age: 52
End: 2017-05-09
Payer: COMMERCIAL

## 2017-05-22 RX ORDER — SODIUM CHLORIDE 9 MG/ML
25 INJECTION, SOLUTION INTRAVENOUS CONTINUOUS
Status: DISPENSED | OUTPATIENT
Start: 2017-05-25 | End: 2017-05-26

## 2017-05-22 RX ORDER — DIPHENHYDRAMINE HCL 25 MG
50 CAPSULE ORAL ONCE
Status: ACTIVE | OUTPATIENT
Start: 2017-05-25 | End: 2017-05-26

## 2017-05-23 ENCOUNTER — APPOINTMENT (OUTPATIENT)
Dept: INFUSION THERAPY | Age: 52
End: 2017-05-23
Payer: COMMERCIAL

## 2017-05-25 ENCOUNTER — HOSPITAL ENCOUNTER (OUTPATIENT)
Dept: INFUSION THERAPY | Age: 52
Discharge: HOME | End: 2017-05-25
Payer: COMMERCIAL

## 2017-05-25 ENCOUNTER — TELEPHONE (OUTPATIENT)
Dept: ONCOLOGY | Age: 52
End: 2017-05-25

## 2017-05-25 NOTE — TELEPHONE ENCOUNTER
Patient left voicemail stating she has an appointment today that she has to cancel because she has a stomach bug or something.  # [phone]

## 2017-05-25 NOTE — TELEPHONE ENCOUNTER
Returned patients call and informed her that i did not see that she had an appointment with our office today that needed to be canceled. Patient stated that it was not with our office it was with the infusion. Informed patient that i could email them and inform them that she is sick and would not be coming in today but she would need to call their office to reschedule because i do not have access to their schedule. Patient stated she did not have their number and did not have a pen. Informed patient that was fine that i would call her back and leave her a message with the number. Patient verbalized understanding. **Called patient back and left the infusions phone number. **Email sent to joy informing her that patient is sick and will not be able to come in today. Joy stated that was fine she would cancel it and wanted to know when patient was going to come back in. Informed joy that i was not sure that their office number was given to patient so that she could call to reschedule. Joy stated that was fine and she would call her if she did not hear from her.

## 2017-05-26 RX ORDER — SODIUM CHLORIDE 9 MG/ML
25 INJECTION, SOLUTION INTRAVENOUS CONTINUOUS
Status: DISPENSED | OUTPATIENT
Start: 2017-05-30 | End: 2017-05-31

## 2017-05-26 RX ORDER — DIPHENHYDRAMINE HCL 25 MG
50 CAPSULE ORAL ONCE
Status: COMPLETED | OUTPATIENT
Start: 2017-05-30 | End: 2017-05-30

## 2017-05-30 ENCOUNTER — TELEPHONE (OUTPATIENT)
Dept: ONCOLOGY | Age: 52
End: 2017-05-30

## 2017-05-30 ENCOUNTER — APPOINTMENT (OUTPATIENT)
Dept: INFUSION THERAPY | Age: 52
End: 2017-05-30
Payer: COMMERCIAL

## 2017-05-30 ENCOUNTER — HOSPITAL ENCOUNTER (OUTPATIENT)
Dept: INFUSION THERAPY | Age: 52
Discharge: HOME | End: 2017-05-30
Payer: COMMERCIAL

## 2017-05-30 VITALS
HEART RATE: 70 BPM | SYSTOLIC BLOOD PRESSURE: 119 MMHG | RESPIRATION RATE: 16 BRPM | BODY MASS INDEX: 32.95 KG/M2 | OXYGEN SATURATION: 97 % | DIASTOLIC BLOOD PRESSURE: 77 MMHG | WEIGHT: 198 LBS | TEMPERATURE: 97.3 F

## 2017-05-30 PROCEDURE — 74011250636 HC RX REV CODE- 250/636: Performed by: INTERNAL MEDICINE

## 2017-05-30 PROCEDURE — 96361 HYDRATE IV INFUSION ADD-ON: CPT

## 2017-05-30 PROCEDURE — 77030012965 HC NDL HUBR BBMI -A

## 2017-05-30 PROCEDURE — 74011250637 HC RX REV CODE- 250/637: Performed by: INTERNAL MEDICINE

## 2017-05-30 PROCEDURE — 96413 CHEMO IV INFUSION 1 HR: CPT

## 2017-05-30 PROCEDURE — 96415 CHEMO IV INFUSION ADDL HR: CPT

## 2017-05-30 PROCEDURE — 74011000250 HC RX REV CODE- 250: Performed by: INTERNAL MEDICINE

## 2017-05-30 RX ORDER — SODIUM CHLORIDE 9 MG/ML
10 INJECTION INTRAMUSCULAR; INTRAVENOUS; SUBCUTANEOUS AS NEEDED
Status: DISPENSED | OUTPATIENT
Start: 2017-05-30 | End: 2017-05-31

## 2017-05-30 RX ORDER — SODIUM CHLORIDE 0.9 % (FLUSH) 0.9 %
10 SYRINGE (ML) INJECTION AS NEEDED
Status: ACTIVE | OUTPATIENT
Start: 2017-05-30 | End: 2017-05-31

## 2017-05-30 RX ORDER — HEPARIN 100 UNIT/ML
500 SYRINGE INTRAVENOUS AS NEEDED
Status: ACTIVE | OUTPATIENT
Start: 2017-05-30 | End: 2017-05-31

## 2017-05-30 RX ADMIN — Medication 10 ML: at 17:45

## 2017-05-30 RX ADMIN — Medication 10 ML: at 15:21

## 2017-05-30 RX ADMIN — SODIUM CHLORIDE 10 ML: 9 INJECTION, SOLUTION INTRAMUSCULAR; INTRAVENOUS; SUBCUTANEOUS at 15:21

## 2017-05-30 RX ADMIN — HEPARIN SODIUM (PORCINE) LOCK FLUSH IV SOLN 100 UNIT/ML 500 UNITS: 100 SOLUTION at 17:45

## 2017-05-30 RX ADMIN — DIPHENHYDRAMINE HYDROCHLORIDE 50 MG: 25 CAPSULE ORAL at 15:20

## 2017-05-30 RX ADMIN — Medication 540 MG: at 16:09

## 2017-05-30 NOTE — PROGRESS NOTES
Outpatient Infusion Center - Chemotherapy Progress Note    1400 Pt admit to [facility] for hydration/Herceptin ambulatory in stable condition. Assessment completed. No new concerns voiced. Portacath without positive blood return. RN received orders- ok to use without blood return if no resistance. Chemotherapy Flowsheet 5/30/2017   Cycle C 8   Date 5/30/2017   Drug / Regimen Herceptin   Notes benadryl 50mg PO       Visit Vitals    /77    Pulse 70    Temp 97.3 °F (36.3 °C)    Resp 16    Wt 89.8 kg (198 lb)    SpO2 97%    BMI 32.95 kg/m2       1 liter NS  50 mg benadryl PO  Herceptin    1730 Pt tolerated treatment well. D/c home ambulatory in no distress.  Pt aware of next appointment scheduled for 6/20/17 @ 2 pm.    No labs

## 2017-05-30 NOTE — TELEPHONE ENCOUNTER
Received a call from the patient and verified ID x 2. The patient stated that she had a \"stomach bug\" last week and treatment was held and that she is feeling \"better\" today 5/30/17 and requested that fluids be added to her treatment today. Informed the patient that Dr. Sheba Altman and Baron Stephany NP will be made aware of the patient's request and this office will call the patient back with recommendations.

## 2017-05-30 NOTE — TELEPHONE ENCOUNTER
Called the patient and verified ID x 2. Informed the patient that orders for fluids have been added to her infusion scheduled for later today 5/30/17. The patient verbalized understanding and denied any further questions or concerns.

## 2017-06-08 ENCOUNTER — TELEPHONE (OUTPATIENT)
Dept: ONCOLOGY | Age: 52
End: 2017-06-08

## 2017-06-08 NOTE — TELEPHONE ENCOUNTER
Called the patient and verified ID x 2. The patient stated that she has had these sinus issues since treatment started and recently saw ENT who mentioned the possibility of surgery to help ease the sinus pressure and the patient inquired if she should schedule surgery, reduce the Herceptin dose, or space out treatment to five or six weeks. Informed the patient that per Lucian Griffin NP that studies showed that two to nine percent of patients had a sinusitis type reaction and are unable to recommend whether to schedule surgery because there is a chance that her sinus issues may be due to the Herceptin. Informed the patient that she is scheduled to see Dr. Alla Perez on 6/13/17 and her next treatment is on 6/20/17. Encouraged the patient to keep her appointment with Dr. Alla Perez on 6/13/17 to discuss her questions and concerns. The patient verbalized understanding and was agreeable to keep her appointment on 6/13/17 and denied any other questions or concerns.

## 2017-06-08 NOTE — TELEPHONE ENCOUNTER
Patient called and stated that she is having some headaches, and a lot of sinus issues and thought she needed to come in. Asked patient if she has been to see her PCP. Patient stated that she saw a ENT and had her sinuses cleaned out and they said they could do a surgery to help with the pressure. Patient stated that she did not want to schedule that without talking to dr. Jarad Powers because she looked online and saw that some people said they had the same issues while on treatment and she does not want to do the surgery if it is something that will go away once she is done with treatment.  # [phone]

## 2017-06-13 ENCOUNTER — APPOINTMENT (OUTPATIENT)
Dept: INFUSION THERAPY | Age: 52
End: 2017-06-13

## 2017-06-13 ENCOUNTER — OFFICE VISIT (OUTPATIENT)
Dept: ONCOLOGY | Age: 52
End: 2017-06-13

## 2017-06-13 VITALS
SYSTOLIC BLOOD PRESSURE: 146 MMHG | TEMPERATURE: 97.5 F | DIASTOLIC BLOOD PRESSURE: 90 MMHG | RESPIRATION RATE: 18 BRPM | BODY MASS INDEX: 33.32 KG/M2 | WEIGHT: 200 LBS | HEIGHT: 65 IN | HEART RATE: 74 BPM | OXYGEN SATURATION: 98 %

## 2017-06-13 DIAGNOSIS — J32.9 CHRONIC SINUSITIS, UNSPECIFIED LOCATION: ICD-10-CM

## 2017-06-13 DIAGNOSIS — R42 DIZZINESS: ICD-10-CM

## 2017-06-13 DIAGNOSIS — G62.9 NEUROPATHY: ICD-10-CM

## 2017-06-13 DIAGNOSIS — R63.5 WEIGHT GAIN: ICD-10-CM

## 2017-06-13 DIAGNOSIS — C50.912 MALIGNANT NEOPLASM OF LEFT FEMALE BREAST, UNSPECIFIED SITE OF BREAST: Primary | ICD-10-CM

## 2017-06-13 NOTE — PROGRESS NOTES
[address], [address] [facility]  [place], [address]  W: [phone]  F: [phone]     f/u HEME/ONC CONSULT    Reason for visit: evaluation for treatment for  Breast Cancer    Consulting physicians:  Dr. Angle Burch; Dr. Mariela Howard    HPI:   Silvano Azul is a 49 y.o.  female who is transferring her care for management of breast cancer. 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 3/9/16 left lumpectomy showed 1.3 cm of IDC, ER + at 82%, AL negative, HER 2 positive (IHC 3+; ratio 5; sig/cell 7.5),  0/2 LN involved, ki67 68%, gr 3, multiple satelite nodules up to 4 mm. [id number]. Was treated at [address] [place] [address] with Dr. Damon Arechiga. Received TH as in APT x 12 weeks (took 14 weeks due to neutropenia). Taxol was ended up DR by 30%. 4/22/16 this started, ended 7/22/16. Did have stomatitis with the outback herceptin. Started 8/19/16    S/p XRT 10/7/16    Did have a bone density study, normal per pt -- done by Dr. Faith Serrato. Anastrozole: 1/2017-3/17/17, stopped due to sinus issues    Interval history: In today for follow up. Complains of gr 1 loss of appetite, gr 1 constipation, gr 1-2 fatigue, gr 1 chills, gr 1 hot flashes, gr 1 insomnia, gr 1 loss of cognition and concentration, gr 1 anxiety, gr 1-2 headache, gr 1 urinary leakage, gr 1 libido. Still holding anastrozole. She had an episode of dizziness and eye floaters in the middle of the night. She is contemplating sinus surgery. Is walking 3-4 hours a day and has started to play tennis again. DX   Encounter Diagnosis   Name Primary?     Malignant neoplasm of left female breast, unspecified site of breast ([facility] Yes      Past Medical History   Diagnosis Date    Abnormal Pap smear 1990s     cryotherapy, no abnormals since    Contact dermatitis and other eczema, due to unspecified cause     Depression     Endometriosis     Malignant [facility] of left female breast ([facility] 3/28/2016    Oligomenorrhea      currently not having menstrual cycles X 3 months     Past Surgical History   Procedure Laterality Date    Hx heent       scarring from sinusitis.  Hx gyn       cervical cryotherapy      Social History     Social History    Marital status:      Spouse name: N/A    Number of children: 1    Years of education: N/A     Occupational History     [employer]      Social History Main Topics    Smoking status: Former Smoker    Smokeless tobacco: None    Alcohol use 1.0 oz/week     2 Cans of beer per week    Drug use: None    Sexual activity: Not Currently     Partners: Male     Birth control/ protection: Condom     Other Topics Concern    None     Social History Narrative     History reviewed. No pertinent family history. Current Outpatient Prescriptions   Medication Sig Dispense Refill    protein powd Take  by mouth daily.  Biotin 2,500 mcg cap Take  by mouth daily.  diazepam (VALIUM) 2 mg tablet Take 2 mg by mouth nightly as needed. 3    CYANOCOBALAMIN, VITAMIN B-12, (VITAMIN B-12 PO) Take  by mouth daily.  CHOLECALCIFEROL, VITAMIN D3, (VITAMIN D3 PO) Take 1,000 Units by mouth daily.  LACTOBACILLUS ACIDOPHILUS (PROBIOTIC PO) Take  by mouth daily.  MULTIVITAMIN (MULTIPLE VITAMIN PO) Take  by mouth daily.  anastrozole (ARIMIDEX) 1 mg tablet Take 1 Tab by mouth daily. 90 Tab 3    lidocaine-prilocaine (EMLA) topical cream Apply  to affected area as needed for Pain. 30 g 0    ibuprofen (ADVIL) 200 mg tablet Take 200 mg by mouth.  ondansetron (ZOFRAN ODT) 4 mg disintegrating tablet Take 1 Tab by mouth every eight (8) hours as needed for Nausea.  20 Tab 0     Facility-Administered Medications Ordered in Other Visits   Medication Dose Route Frequency Provider Last Rate Last Dose    sodium chloride 0.9 % injection 10 mL  10 mL IntraVENous PRN Ady Gaston MD        heparin (porcine) pf 500 Units 500 Units IntraVENous PRN Renan Cardozo MD        sodium chloride (NS) flush 10-40 mL  10-40 mL IntraVENous PRN Renan Cardozo MD        0.9% sodium chloride infusion  25 mL/hr IntraVENous CONTINUOUS Renan Cardozo MD        diphenhydrAMINE (BENADRYL) capsule 50 mg  50 mg Oral ONCE Renan Cardozo MD        trastuzumab (HERCEPTIN) 540 mg in 0.9% sodium chloride 250 mL, overfill volume 25 mL IVPB  540 mg IntraVENous ONCE Renan Cardozo MD         Allergies   Allergen Reactions    Augmentin [Amoxicillin-Pot Clavulanate] Nausea and Vomiting     Projectile vomiting, sweats, profuse diarrhea    Gluten Other (comments)     insensitity    Other Medication Hives     H2 antagonists,      Review of Systems    A comprehensive review of systems was performed and all systems were negative except for HPI and for the symptom review form, reviewed and scanned in.    Objective:  Visit Vitals    /84    Pulse 83    Temp 97.3 °F (36.3 °C) (Temporal)    Resp 18    Ht 5' 5\" (1.651 m)    Wt 201 lb 9.6 oz (91.4 kg)    SpO2 99%    BMI 33.55 kg/m2         General:  Alert, cooperative, no distress, appears stated age. Head:  Normocephalic, without obvious abnormality, atraumatic. Eyes:  Conjunctivae/corneas clear. PERRL, EOMs intact. Throat: Lips, mucosa, and tongue normal.    Neck: Supple, symmetrical, trachea midline, no adenopathy, thyroid: no enlargement/tenderness/nodules   Back:   Symmetric, no curvature. ROM normal. No CVA tenderness. Lungs:   Clear to auscultation bilaterally. Chest wall:  No tenderness or deformity. Heart:  Regular rate and rhythm, S1, S2 normal, no murmur, click, rub or gallop. Abdomen:   Soft, non-tender. Bowel sounds normal. No masses,  No organomegaly. Extremities: Extremities normal, atraumatic, no cyanosis or edema. Skin: Skin color, texture, turgor normal. No rashes or lesions. Neurologic: CNII-XII intact.        Diagnostic Imaging   No results found for this or any previous visit. Lab Results  Lab Results   Component Value Date/Time    WBC 6.5 10/03/2014 03:36 PM    HGB 13.6 10/03/2014 03:36 PM    HCT 41.2 10/03/2014 03:36 PM    PLATELET 375 71/71/3922 03:36 PM    MCV 91 10/03/2014 03:36 PM     Lab Results   Component Value Date/Time    Sodium 142 11/14/2016 04:12 PM    Potassium 3.8 11/14/2016 04:12 PM    Chloride 105 11/14/2016 04:12 PM    CO2 32 11/14/2016 04:12 PM    Anion gap 5 11/14/2016 04:12 PM    Glucose 111 11/14/2016 04:12 PM    BUN 10 11/14/2016 04:12 PM    Creatinine 0.77 11/14/2016 04:12 PM    BUN/Creatinine ratio 13 11/14/2016 04:12 PM    GFR est AA >60 11/14/2016 04:12 PM    GFR est non-AA >60 11/14/2016 04:12 PM    Calcium 8.5 11/14/2016 04:12 PM     Assessment/Plan:  49 y.o. female with L IDC, ER +, CO negative, HER 2 +, 1.3 cm, gr 3, 0/2 LN involved. PS 0    1. Left Breast cancer stage: IA    Hormonal therapy: administered    We explained to the patient that the goal of systemic adjuvant therapy is to improve the chances for cure and decrease the risk of relapse. We explained why a patient can have microscopic cancer spread now even though physical examination, laboratory studies and imaging studies are negative for cancer. We explained that the same treatments used now as adjuvant or preventive treatments rarely if ever are curative in women who develop metastases. Using PREDICT!, without therapy, her 10 year OS is 79%; with therapy that increases to 89%, endocrine therapy is 5% of that. Rationale for therapy with trastuzumab was also discussed with the patient including a 50% proportional improvement in disease free survival and also an improvement in overall survival in patients receiving trastuzumab and chemotherapy for HER-2 positive breast cancer. The side effects of trastuzumab were discussed including a 4%-5% risk of dropping her ejection fraction while on treatment and about a 1% risk of CHF.   We discussed that this drug will be used every 3 weeks for remainder of a year following the chemotherapy cycles. We will check her EF before chemotherapy and every 3 months while she is receiving trastuzumab. Informed consent signed. She feels that she is still cycling, but not having periods. FSH, LH, and estradiol reviewed from 6/1/15, appears postmenopausal, but estradiol not < 6, but close. Checked her [facility], LH, estradiol in 12/2016 which confirmed that she is postmenopausal. Has not had a period in 3 years. She has stopped Anastrozole as she feels that it is drying out her sinuses. The risks and benefits of tamoxifen were discussed in detail and the patient was informed of the following: Risks include a 1% risk of endometrial cancer for postmenopausal women treated for five years but no (or a minimally increased) risk in premenopausal women and that most women who develop tamoxifen-associated endometrial cancer can be cured. Any bleeding in a postmenopausal woman should be reported to a health care professional. There is also a 1% risk of blood clots (thromboembolism) that can be fatal. All patients irrespective of age who take tamoxifen and who have not had a hysterectomy should have a pelvic exam and Pap smear yearly. Tamoxifen increases the risk of cataract formation and on rare occasions has caused retinal damage: an eye exam is recommended yearly. Other risks include vaginal discharge or dryness, the development or worsening of hot flashes or vasomotor symptoms, and bone loss in premenopausal women. There is excellent evidence that tamoxifen does not increase risk of depression, cause weight gain or have a major effect on sexual function. Available data suggests little or no effect on cognitive function. Benefits include a lowering of cholesterol and a reduction in the rate of bone loss for postmenopausal woman. Any other symptoms should be reported.     After this discussion, she wishes to not take any endocrine therapy at this time. I informed her of the benefit that this provided in regards to breast cancer recurrence. She verbalizes understanding. She wishes to readdress this once completed with her outback therapy. Outback Herceptin #8 due on 6/20/17. TTE on 2/2/17, EF 69%. TTE on 4/10/17, EF 63%. Eliel mammogram and breast MRI in April with Dr. Jensen Martinez, negative per patient. I will reach out to Dr. Jensen Martinez to discuss getting a mammogram and MRI at the same time each year. 2. Emotional well being: She has excellent support and is coping well with her disease. 3. Side effects from trastuzumab: Including muscle pain, mouth sores, fatigue. Will monitor, pre med with benadryl. 4. 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 Mostly in fingers. Will monitor. 5. Weight gain/loss of appetite: She has a desire to get back down to her pre chemo weight. Matthew Olivia RD has reached out to her. 6. Pharyngitis/Sinusitis: some improvement with stopping anastrozole. ENT, Dr. Stevo Deleon following. S/p z alonso and doxycyline. She had a sinus CT which was rather unremarkable. 7. Dizziness/eye floaters: new, maybe slightly better. We discussed a brain MRI however she feels that this is all from sinusitis. Will keep a close eye on this and she will call us if this worsens. > 25 minutes were spent with this patient with > 50% of that time spent in face to face counseling.         Iliana Spence MD

## 2017-06-13 NOTE — MR AVS SNAPSHOT
Visit Information Date & Time Provider Department Dept. Phone Encounter #  
 6/13/2017 11:15 AM Narciso Mcgregormyratasneem at [address] [facility] [id number] Follow-up Instructions Return in about 4 weeks (around 7/11/2017) for fu, sand/patricia youssef h 9. Your Appointments 7/11/2017  1:15 PM  
ESTABLISHED PATIENT with Chary rCews NP  
[facility] at [address] [facility]) Appt Note: fu, sand/patricia youssef h [address] [place] [address], [address] [address]  
[phone]  
  
   
 [address] [place] [address], [address] [place] [address] Upcoming Health Maintenance Date Due Pneumococcal 19-64 Highest Risk (1 of 3 - PCV13) 2/15/1985 DTaP/Tdap/Td series (1 - Tdap) 2/15/1987 FOBT Q 1 YEAR AGE 50-75 2/15/2016 INFLUENZA AGE 9 TO ADULT 8/1/2017 BREAST CANCER SCRN MAMMOGRAM 2/12/2018 PAP AKA CERVICAL CYTOLOGY 6/1/2018 Allergies as of 6/13/2017  Review Complete On: 6/13/2017 By: Chary Crews NP Severity Noted Reaction Type Reactions Augmentin [Amoxicillin-pot Clavulanate]  08/29/2013    Nausea and Vomiting Projectile vomiting, sweats, profuse diarrhea Gluten  09/24/2014    Other (comments)  
 insensitity Other Medication  06/24/2013    Hives H2 antagonists,   
  
Current Immunizations  Reviewed on 5/30/2017 No immunizations on file. Not reviewed this visit You Were Diagnosed With   
  
 Codes Comments Malignant neoplasm of left female breast, unspecified site of breast ([facility]    -  Primary ICD-10-CM: X04.765 ICD-9-CM: 174.9 Neuropathy     ICD-10-CM: G62.9 ICD-9-CM: 355.9 Weight gain     ICD-10-CM: R63.5 ICD-9-CM: 783.1 Dizziness     ICD-10-CM: N37 ICD-9-CM: 780. 4 Chronic sinusitis, unspecified location     ICD-10-CM: J32.9 ICD-9-CM: 473.9 Vitals BP Pulse Temp Resp Height(growth percentile) Weight(growth percentile) 146/90 74 97.5 °F (36.4 °C) (Temporal) 18 5' 5\" (1.651 m) 200 lb (90.7 kg) SpO2 BMI OB Status Smoking Status 98% 33.28 kg/m2 Menopause Former Smoker Vitals History BMI and BSA Data Body Mass Index Body Surface Area  
 33.28 kg/m 2 2.04 m 2 Preferred Pharmacy Pharmacy Name Phone [id number]- [address], [address] [phone] Your Updated Medication List  
  
   
This list is accurate as of: 6/13/17  1:00 PM.  Always use your most recent med list. ADVIL 200 mg tablet Generic drug:  ibuprofen Take 200 mg by mouth as needed. Biotin 2,500 mcg Cap Take  by mouth daily. diazePAM 2 mg tablet Commonly known as:  VALIUM Take 4 mg by mouth nightly as needed. lidocaine-prilocaine topical cream  
Commonly known as:  EMLA Apply  to affected area as needed for Pain. MULTIPLE VITAMIN PO Take  by mouth daily. ondansetron 4 mg disintegrating tablet Commonly known as:  ZOFRAN ODT Take 1 Tab by mouth every eight (8) hours as needed for Nausea. PROBIOTIC PO Take  by mouth daily. protein Powd Take  by mouth daily. VITAMIN B-12 PO Take  by mouth daily. VITAMIN D3 PO Take 1,000 Units by mouth daily. Follow-up Instructions Return in about 4 weeks (around 7/11/2017) for fu, sand/patricia youssef 9. To-Do List   
 06/20/2017 2:00 PM  
  Appointment with [address] at [place] [address] ([phone])  
  
 07/11/2017 2:00 PM  
  Appointment with [address] at [place] [address] ([phone]) Introducing [facility]! Elyse Valentine introduces [employer] patient portal. Now you can access parts of your medical record, email your doctor's office, and request medication refills online.    
 
1. In your internet browser, go to [email or web address] 2. Click on the First Time User? Click Here link in the Sign In box. You will see the New Member Sign Up page. 3. Enter your [employer] Access Code exactly as it appears below. You will not need to use this code after youve completed the sign-up process. If you do not sign up before the expiration date, you must request a new code. · [employer] Access Code: [email or web address] Expires: 9/11/2017 11:19 AM 
 
4. Enter the last four digits of your Social Security Number (xxxx) and Date of Birth (mm/dd/yyyy) as indicated and click Submit. You will be taken to the next sign-up page. 5. Create a Search Initiativest ID. This will be your [employer] login ID and cannot be changed, so think of one that is secure and easy to remember. 6. Create a [employer] password. You can change your password at any time. 7. Enter your Password Reset Question and Answer. This can be used at a later time if you forget your password. 8. Enter your e-mail address. You will receive e-mail notification when new information is available in [address] 9. Click Sign Up. You can now view and download portions of your medical record. 10. Click the Download Summary menu link to download a portable copy of your medical information. If you have questions, please visit the Frequently Asked Questions section of the [employer] website. Remember, [employer] is NOT to be used for urgent needs. For medical emergencies, dial 911. Now available from your iPhone and Android! Please provide this summary of care documentation to your next provider. Your primary care clinician is listed as Joss Velasquez. If you have any questions after today's visit, please call [phone].

## 2017-06-15 RX ORDER — SODIUM CHLORIDE 9 MG/ML
25 INJECTION, SOLUTION INTRAVENOUS CONTINUOUS
Status: DISPENSED | OUTPATIENT
Start: 2017-06-20 | End: 2017-06-21

## 2017-06-15 RX ORDER — DIPHENHYDRAMINE HCL 25 MG
50 CAPSULE ORAL ONCE
Status: COMPLETED | OUTPATIENT
Start: 2017-06-20 | End: 2017-06-20

## 2017-06-20 ENCOUNTER — HOSPITAL ENCOUNTER (OUTPATIENT)
Dept: INFUSION THERAPY | Age: 52
Discharge: HOME | End: 2017-06-20
Payer: COMMERCIAL

## 2017-06-20 VITALS
RESPIRATION RATE: 16 BRPM | SYSTOLIC BLOOD PRESSURE: 119 MMHG | DIASTOLIC BLOOD PRESSURE: 73 MMHG | TEMPERATURE: 98.2 F | BODY MASS INDEX: 34.95 KG/M2 | WEIGHT: 210 LBS | HEART RATE: 65 BPM

## 2017-06-20 PROCEDURE — 96361 HYDRATE IV INFUSION ADD-ON: CPT

## 2017-06-20 PROCEDURE — 77030012965 HC NDL HUBR BBMI -A

## 2017-06-20 PROCEDURE — 96413 CHEMO IV INFUSION 1 HR: CPT

## 2017-06-20 PROCEDURE — 74011000250 HC RX REV CODE- 250: Performed by: INTERNAL MEDICINE

## 2017-06-20 PROCEDURE — 74011250636 HC RX REV CODE- 250/636: Performed by: NURSE PRACTITIONER

## 2017-06-20 PROCEDURE — 96415 CHEMO IV INFUSION ADDL HR: CPT

## 2017-06-20 PROCEDURE — 74011250637 HC RX REV CODE- 250/637: Performed by: INTERNAL MEDICINE

## 2017-06-20 PROCEDURE — 74011250636 HC RX REV CODE- 250/636: Performed by: INTERNAL MEDICINE

## 2017-06-20 RX ORDER — SODIUM CHLORIDE 9 MG/ML
10 INJECTION INTRAMUSCULAR; INTRAVENOUS; SUBCUTANEOUS AS NEEDED
Status: ACTIVE | OUTPATIENT
Start: 2017-06-20 | End: 2017-06-21

## 2017-06-20 RX ORDER — HEPARIN 100 UNIT/ML
500 SYRINGE INTRAVENOUS AS NEEDED
Status: ACTIVE | OUTPATIENT
Start: 2017-06-20 | End: 2017-06-21

## 2017-06-20 RX ORDER — SODIUM CHLORIDE 0.9 % (FLUSH) 0.9 %
10-40 SYRINGE (ML) INJECTION AS NEEDED
Status: DISCONTINUED | OUTPATIENT
Start: 2017-06-20 | End: 2017-06-24 | Stop reason: HOSPADM

## 2017-06-20 RX ADMIN — SODIUM CHLORIDE 10 ML: 9 INJECTION, SOLUTION INTRAMUSCULAR; INTRAVENOUS; SUBCUTANEOUS at 14:20

## 2017-06-20 RX ADMIN — DIPHENHYDRAMINE HYDROCHLORIDE 50 MG: 25 CAPSULE ORAL at 14:27

## 2017-06-20 RX ADMIN — SODIUM CHLORIDE 1000 ML: 900 INJECTION, SOLUTION INTRAVENOUS at 14:56

## 2017-06-20 RX ADMIN — Medication 540 MG: at 15:41

## 2017-06-20 NOTE — PROGRESS NOTES
[facility] - Chemotherapy Progress Note    3755 Pt admit to [facility] for Herceptin Cycle 9 ambulatory in stable condition. Assessment completed. No new concerns voiced. Port accessed with positive blood return. No labs ordered for today. Pt requesting to have NS bolus. MD contacted for NS bolus orders. Orders received. Pt connected to bolus. Premeds given. Visit Vitals    /74 (BP 1 Location: Right arm, BP Patient Position: Sitting)    Pulse 81    Temp 97.4 °F (36.3 °C)    Resp 16    Wt 95.3 kg (210 lb)    Breastfeeding No    BMI 34.95 kg/m2       Medications:  NS 1000 mL bolus  Benadryl 50 mg po  Herceptin    1720 Pt tolerated treatment well. Port maintained positive blood return throughout treatment, flushed with positive blood return at conclusion and heparinized and de-accessed per protocol. D/c home ambulatory in no distress. Pt aware of next [facility] appointment scheduled for 7/11/17.

## 2017-06-21 ENCOUNTER — TELEPHONE (OUTPATIENT)
Dept: ONCOLOGY | Age: 52
End: 2017-06-21

## 2017-06-21 NOTE — TELEPHONE ENCOUNTER
Patient called and stated that she had a massage and they told her she had some round \"things\" on her back. Patient stated that they do not hurt, they are kind of hard, and they move. Patient stated she is not sure if she needs to set up an appointment with our office or who she needs to follow up with.  # [phone]

## 2017-06-21 NOTE — TELEPHONE ENCOUNTER
Called the patient and verified ID x 2. The patient stated that her masseuse felt two spots on her back that were not there before and encouraged the patient to contact her oncologist.  Informed the patient that Dr. Javi Pappas and Hampton Blizzard, NP recommend that she contact her dermatologist.  The patient verbalized understanding and stated that she thinks they are \"fatty deposits\" and that they are not \"related to her cancer\" and that she will contact her dermatologist.  The patient denied any further questions or concerns.

## 2017-07-03 ENCOUNTER — TELEPHONE (OUTPATIENT)
Dept: ONCOLOGY | Age: 52
End: 2017-07-03

## 2017-07-03 NOTE — TELEPHONE ENCOUNTER
Patient called stating that the nails on both of her big toes are beginning to lift up, and thinks she may have a fungus. She stated she has not had chemo in over a year, but is still taking the Herceptin. Patient was wondering if the Herceptin could be causing these effects, or if it was something else.  # [phone]

## 2017-07-03 NOTE — TELEPHONE ENCOUNTER
Called the patient and verified ID x 2. Inquired if the patient notices any smell or discharge from her toenails and the patient stated that both toenails are black and are beginning to lift up off her toes but does not notice any discharge or smell. Informed the patient that per Darnell Green NP without any smell or discharge and the fact that it is not affecting all of her toes it is most likely not fungal and encouraged the patient to continue to monitor both toenails and to call Dr. Jessica Duron office if she starts to notice a smell or discharge and that this is most likely due to previous treatment and/or Herceptin. The patient verbalized understanding and denied any further questions or concerns.

## 2017-07-05 ENCOUNTER — APPOINTMENT (OUTPATIENT)
Dept: INFUSION THERAPY | Age: 52
End: 2017-07-05
Payer: COMMERCIAL

## 2017-07-11 ENCOUNTER — HOSPITAL ENCOUNTER (OUTPATIENT)
Dept: INFUSION THERAPY | Age: 52
End: 2017-07-11

## 2017-07-11 ENCOUNTER — OFFICE VISIT (OUTPATIENT)
Dept: ONCOLOGY | Age: 52
End: 2017-07-11

## 2017-07-11 VITALS
RESPIRATION RATE: 18 BRPM | HEIGHT: 65 IN | OXYGEN SATURATION: 96 % | HEART RATE: 85 BPM | SYSTOLIC BLOOD PRESSURE: 115 MMHG | TEMPERATURE: 96.7 F | DIASTOLIC BLOOD PRESSURE: 73 MMHG

## 2017-07-11 DIAGNOSIS — G62.9 NEUROPATHY: ICD-10-CM

## 2017-07-11 DIAGNOSIS — B35.1 TOENAIL FUNGUS: ICD-10-CM

## 2017-07-11 DIAGNOSIS — C50.912 MALIGNANT NEOPLASM OF LEFT FEMALE BREAST, UNSPECIFIED SITE OF BREAST: Primary | ICD-10-CM

## 2017-07-11 DIAGNOSIS — J32.9 CHRONIC SINUSITIS, UNSPECIFIED LOCATION: ICD-10-CM

## 2017-07-11 DIAGNOSIS — R63.5 WEIGHT GAIN: ICD-10-CM

## 2017-07-11 NOTE — PROGRESS NOTES
[address], [address] [place] [address]  [place], [address]  W: [phone]  F: [phone]     f/u HEME/ONC CONSULT    Reason for visit: evaluation for treatment for  Breast Cancer    Consulting physicians:  Dr. Jose Mitchell; Dr. Ruthann Hess    HPI:   Amy Sumner is a 49 y.o.  female who is transferring her care for management of breast cancer. 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 3/9/16 left lumpectomy showed 1.3 cm of IDC, ER + at 82%, NJ negative, HER 2 positive (IHC 3+; ratio 5; sig/cell 7.5),  0/2 LN involved, ki67 68%, gr 3, multiple satelite nodules up to 4 mm. [id number]. Was treated at [address] [place] [address] with Dr. Contreras Galan. Received TH as in APT x 12 weeks (took 14 weeks due to neutropenia). Taxol was ended up DR by 30%. 4/22/16 this started, ended 7/22/16. Did have stomatitis with the outback herceptin. Started 8/19/16, completed 6/20/17    S/p XRT 10/7/16    Did have a bone density study, normal per pt -- done by Dr. Harper Coe. Anastrozole: 1/2017-3/17/17, stopped due to sinus issues    Interval history: In today for follow up. Complains of gr 1 loss of appetite, gr 1 constipation, gr 1 fatigue, gr 1 hot flashes, gr 1 insomnia, gr 1 loss of cognition and concentration, gr 1 anxiety, gr 1 neuropathy, gr 1 headache, gr 1 urinary leakage, gr 1 libido, gr 1 vaginal dryness. She believes she has some toe fungus. Is walking 3-4 hours a day and has started to play tennis again. DX   Encounter Diagnosis   Name Primary?     Malignant neoplasm of left female breast, unspecified site of breast ([facility] Yes      Past Medical History   Diagnosis Date    Abnormal Pap smear 1990s     cryotherapy, no abnormals since    Contact dermatitis and other eczema, due to unspecified cause     Depression     Endometriosis     Malignant neoplasm of left female breast ([facility] 3/28/2016    Oligomenorrhea currently not having menstrual cycles X 3 months     Past Surgical History   Procedure Laterality Date    Hx heent       scarring from sinusitis.  Hx gyn       cervical cryotherapy      Social History     Social History    Marital status:      Spouse name: N/A    Number of children: 1    Years of education: N/A     Occupational History     [employer]      Social History Main Topics    Smoking status: Former Smoker    Smokeless tobacco: None    Alcohol use 1.0 oz/week     2 Cans of beer per week    Drug use: None    Sexual activity: Not Currently     Partners: Male     Birth control/ protection: Condom     Other Topics Concern    None     Social History Narrative     History reviewed. No pertinent family history. Current Outpatient Prescriptions   Medication Sig Dispense Refill    protein powd Take  by mouth daily.  Biotin 2,500 mcg cap Take  by mouth daily.  diazepam (VALIUM) 2 mg tablet Take 2 mg by mouth nightly as needed. 3    CYANOCOBALAMIN, VITAMIN B-12, (VITAMIN B-12 PO) Take  by mouth daily.  CHOLECALCIFEROL, VITAMIN D3, (VITAMIN D3 PO) Take 1,000 Units by mouth daily.  LACTOBACILLUS ACIDOPHILUS (PROBIOTIC PO) Take  by mouth daily.  MULTIVITAMIN (MULTIPLE VITAMIN PO) Take  by mouth daily.  anastrozole (ARIMIDEX) 1 mg tablet Take 1 Tab by mouth daily. 90 Tab 3    lidocaine-prilocaine (EMLA) topical cream Apply  to affected area as needed for Pain. 30 g 0    ibuprofen (ADVIL) 200 mg tablet Take 200 mg by mouth.  ondansetron (ZOFRAN ODT) 4 mg disintegrating tablet Take 1 Tab by mouth every eight (8) hours as needed for Nausea.  20 Tab 0     Facility-Administered Medications Ordered in Other Visits   Medication Dose Route Frequency Provider Last Rate Last Dose    sodium chloride 0.9 % injection 10 mL  10 mL IntraVENous PRN Michael Keller MD        heparin (porcine) pf 500 Units  500 Units IntraVENous PRN MD Sierra Delarosa sodium chloride (NS) flush 10-40 mL  10-40 mL IntraVENous PRN Lily Huang MD        0.9% sodium chloride infusion  25 mL/hr IntraVENous CONTINUOUS Lily Huang MD        diphenhydrAMINE (BENADRYL) capsule 50 mg  50 mg Oral ONCE Lily Huang MD        trastuzumab (HERCEPTIN) 540 mg in 0.9% sodium chloride 250 mL, overfill volume 25 mL IVPB  540 mg IntraVENous ONCE Lily Huang MD         Allergies   Allergen Reactions    Augmentin [Amoxicillin-Pot Clavulanate] Nausea and Vomiting     Projectile vomiting, sweats, profuse diarrhea    Gluten Other (comments)     insensitity    Other Medication Hives     H2 antagonists,      Review of Systems    A comprehensive review of systems was performed and all systems were negative except for HPI and for the symptom review form, reviewed and scanned in.    Objective:  Visit Vitals    /84    Pulse 83    Temp 97.3 °F (36.3 °C) (Temporal)    Resp 18    Ht 5' 5\" (1.651 m)    Wt 201 lb 9.6 oz (91.4 kg)    SpO2 99%    BMI 33.55 kg/m2         General:  Alert, cooperative, no distress, appears stated age. Head:  Normocephalic, without obvious abnormality, atraumatic. Eyes:  Conjunctivae/corneas clear. PERRL, EOMs intact. Throat: Lips, mucosa, and tongue normal.    Neck: Supple, symmetrical, trachea midline, no adenopathy, thyroid: no enlargement/tenderness/nodules   Back:   Symmetric, no curvature. ROM normal. No CVA tenderness. Lungs:   Clear to auscultation bilaterally. Chest wall:  No tenderness or deformity. Heart:  Regular rate and rhythm, S1, S2 normal, no murmur, click, rub or gallop. Abdomen:   Soft, non-tender. Bowel sounds normal. No masses,  No organomegaly. Extremities: Extremities normal, atraumatic, no cyanosis or edema. Skin: Skin color, texture, turgor normal. No rashes or lesions. Neurologic: CNII-XII intact. Diagnostic Imaging   No results found for this or any previous visit.     Lab Results  Lab Results Component Value Date/Time    WBC 6.5 10/03/2014 03:36 PM    HGB 13.6 10/03/2014 03:36 PM    HCT 41.2 10/03/2014 03:36 PM    PLATELET 270 71/58/1659 03:36 PM    MCV 91 10/03/2014 03:36 PM     Lab Results   Component Value Date/Time    Sodium 142 11/14/2016 04:12 PM    Potassium 3.8 11/14/2016 04:12 PM    Chloride 105 11/14/2016 04:12 PM    CO2 32 11/14/2016 04:12 PM    Anion gap 5 11/14/2016 04:12 PM    Glucose 111 11/14/2016 04:12 PM    BUN 10 11/14/2016 04:12 PM    Creatinine 0.77 11/14/2016 04:12 PM    BUN/Creatinine ratio 13 11/14/2016 04:12 PM    GFR est AA >60 11/14/2016 04:12 PM    GFR est non-AA >60 11/14/2016 04:12 PM    Calcium 8.5 11/14/2016 04:12 PM     Assessment/Plan:  49 y.o. female with L IDC, ER +, WI negative, HER 2 +, 1.3 cm, gr 3, 0/2 LN involved. PS 0    1. Left Breast cancer stage: IA    Hormonal therapy: administered    We explained to the patient that the goal of systemic adjuvant therapy is to improve the chances for cure and decrease the risk of relapse. We explained why a patient can have microscopic cancer spread now even though physical examination, laboratory studies and imaging studies are negative for cancer. We explained that the same treatments used now as adjuvant or preventive treatments rarely if ever are curative in women who develop metastases. Using PREDICT!, without therapy, her 10 year OS is 79%; with therapy that increases to 89%, endocrine therapy is 5% of that. Rationale for therapy with trastuzumab was also discussed with the patient including a 50% proportional improvement in disease free survival and also an improvement in overall survival in patients receiving trastuzumab and chemotherapy for HER-2 positive breast cancer. The side effects of trastuzumab were discussed including a 4%-5% risk of dropping her ejection fraction while on treatment and about a 1% risk of CHF.   We discussed that this drug will be used every 3 weeks for remainder of a year following the chemotherapy cycles. We will check her EF before chemotherapy and every 3 months while she is receiving trastuzumab. Informed consent signed. She feels that she is still cycling, but not having periods. FSH, LH, and estradiol reviewed from 6/1/15, appears postmenopausal, but estradiol not < 6, but close. Checked her [address], LH, estradiol in 12/2016 which confirmed that she is postmenopausal. Has not had a period in 3 years. She has stopped Anastrozole as she feels that it is drying out her sinuses. After this discussion, she wishes to not take any endocrine therapy at this time. I informed her of the benefit that this provided in regards to breast cancer recurrence. She verbalizes understanding. She wishes to readdress this once she returns from her vacation. Outback Herceptin #9 on 6/20/17. 6 month TTE due in January. TTE on 2/2/17, EF 69%. TTE on 4/10/17, EF 63%. Eliel mammogram and breast MRI in April with Dr. Lisandra Westbrook, negative per patient. I will reach out to Dr. Lisandra Westbrook to discuss getting a mammogram and MRI at the same time each year. MRI otherwise in Oct      I will see her back in 2 months for follow up. We will discuss resuming the anastrozole at that visit. 2. Emotional well being: She has excellent support and is coping well with her disease. 3. 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 Mostly in fingers. Will monitor. 4. Weight gain/loss of appetite: She has a desire to get back down to her pre chemo weight. Kalpana Pollock RD has reached out to her. 5. Pharyngitis/Sinusitis: some improvement with stopping anastrozole. ENT, Dr. Roxane Luevano following. S/p z alonso and doxycyline. She had a sinus CT which was rather unremarkable. 6. Toenail fungus: on big toe. Could be due to treatment. Efinaconazole topical solution, rx in. Monitor. > 25 minutes were spent with this patient with > 50% of that time spent in face to face counseling.     Fede Karimi MD

## 2017-07-11 NOTE — MR AVS SNAPSHOT
Visit Information Date & Time Provider Department Dept. Phone Encounter #  
 7/11/2017  1:15 PM Yahaira Limon at [address] [facility] [address] [id number] Follow-up Instructions Return for 2m mikael evangelista. Follow-up and Disposition History Your Appointments 9/12/2017 11:30 AM  
ESTABLISHED PATIENT with Christiano Nice MD  
[facility] at [address] [place] [address] [facility] Appt Note: 2m mikael evangelista [address] [facility], [address] [facility] [address] [address]  
[phone]  
  
   
 [address] [facility], [address] [place] [address] Upcoming Health Maintenance Date Due Pneumococcal 19-64 Highest Risk (1 of 3 - PCV13) 2/15/1985 DTaP/Tdap/Td series (1 - Tdap) 2/15/1987 FOBT Q 1 YEAR AGE 50-75 2/15/2016 INFLUENZA AGE 9 TO ADULT 8/1/2017 BREAST CANCER SCRN MAMMOGRAM 2/12/2018 PAP AKA CERVICAL CYTOLOGY 6/1/2018 Allergies as of 7/11/2017  Review Complete On: 7/11/2017 By: Christiano Nice MD  
  
 Severity Noted Reaction Type Reactions Augmentin [Amoxicillin-pot Clavulanate]  08/29/2013    Nausea and Vomiting Projectile vomiting, sweats, profuse diarrhea Gluten  09/24/2014    Other (comments)  
 insensitity Other Medication  06/24/2013    Hives H2 antagonists,   
  
Current Immunizations  Reviewed on 6/20/2017 No immunizations on file. Not reviewed this visit You Were Diagnosed With   
  
 Codes Comments Malignant neoplasm of left female breast, unspecified site of breast ([facility]    -  Primary ICD-10-CM: Z41.922 ICD-9-CM: 174.9 Neuropathy     ICD-10-CM: G62.9 ICD-9-CM: 355.9 Weight gain     ICD-10-CM: R63.5 ICD-9-CM: 783.1 Chronic sinusitis, unspecified location     ICD-10-CM: J32.9 ICD-9-CM: 473.9 Toenail fungus     ICD-10-CM: B35.1 ICD-9-CM: 110.1 Vitals BP Pulse Temp Resp Height(growth percentile) SpO2 115/73 (BP 1 Location: Right arm, BP Patient Position: Sitting) 85 96.7 °F (35.9 °C) (Oral) 18 5' 5\" (1.651 m) 96% OB Status Smoking Status Menopause Former Smoker Vitals History Preferred Pharmacy Pharmacy Name Phone [id number] [phone] , [address] [phone] Your Updated Medication List  
  
   
This list is accurate as of: 7/11/17  2:36 PM.  Always use your most recent med list. ADVIL 200 mg tablet Generic drug:  ibuprofen Take 200 mg by mouth as needed. Biotin 2,500 mcg Cap Take  by mouth daily. diazePAM 2 mg tablet Commonly known as:  VALIUM Take 4 mg by mouth nightly as needed. efinaconazole Aggie topical solution Commonly known as:  Elwanda [place] Apply to affected toenails once daily for 4 weeks. lidocaine-prilocaine topical cream  
Commonly known as:  EMLA Apply  to affected area as needed for Pain. MULTIPLE VITAMIN PO Take  by mouth daily. ondansetron 4 mg disintegrating tablet Commonly known as:  ZOFRAN ODT Take 1 Tab by mouth every eight (8) hours as needed for Nausea. PROBIOTIC PO Take  by mouth daily. protein Powd Take  by mouth daily. VITAMIN B-12 PO Take  by mouth daily. VITAMIN D3 PO Take 1,000 Units by mouth daily. Prescriptions Sent to Pharmacy Refills  
 efinaconazole (JUBLIA) aggie topical solution 0 Sig: Apply to affected toenails once daily for 4 weeks. Class: Normal  
 Pharmacy: [address] [place] [address], [address] Ph #: [phone] Follow-up Instructions Return for 2m mikael evangelista. Introducing [facility]! Prabhjot Freed introduces [employer] patient portal. Now you can access parts of your medical record, email your doctor's office, and request medication refills online. 1. In your internet browser, go to https://[employer]. [employer]. [employer] 2. Click on the First Time User? Click Here link in the Sign In box. You will see the New Member Sign Up page. 3. Enter your [employer] Access Code exactly as it appears below. You will not need to use this code after youve completed the sign-up process. If you do not sign up before the expiration date, you must request a new code. · [employer] Access Code: [email or web address] Expires: 9/11/2017 11:19 AM 
 
4. Enter the last four digits of your Social Security Number (xxxx) and Date of Birth (mm/dd/yyyy) as indicated and click Submit. You will be taken to the next sign-up page. 5. Create a [employer] ID. This will be your [employer] login ID and cannot be changed, so think of one that is secure and easy to remember. 6. Create a [employer] password. You can change your password at any time. 7. Enter your Password Reset Question and Answer. This can be used at a later time if you forget your password. 8. Enter your e-mail address. You will receive e-mail notification when new information is available in [address] 9. Click Sign Up. You can now view and download portions of your medical record. 10. Click the Download Summary menu link to download a portable copy of your medical information. If you have questions, please visit the Frequently Asked Questions section of the [employer] website. Remember, [employer] is NOT to be used for urgent needs. For medical emergencies, dial 911. Now available from your iPhone and Android! Please provide this summary of care documentation to your next provider. Your primary care clinician is listed as Janeth Dan. If you have any questions after today's visit, please call [phone].

## 2017-07-11 NOTE — PROGRESS NOTES
Amanda Stanley is a 47 y.o. female  Chief Complaint   Patient presents with    Follow-up     breast cancer

## 2017-08-01 ENCOUNTER — TELEPHONE (OUTPATIENT)
Dept: ONCOLOGY | Age: 52
End: 2017-08-01

## 2017-08-01 NOTE — TELEPHONE ENCOUNTER
Patient called stating that her PCP, Dr. Damion Lopez, would like to speak with Sha Reynoso regarding her disability. She stated that she is supposed to be getting 6-8 weeks after her last treatment date and disability is stating that she has been released as of 07/12/17. He wants to make sure that all the paperwork is right and non conflicting. She stated that it is okay to speak with Yasmine his nurse if he is not available. Dr Dahiana Frazier office number is [phone] Also his email is [email or web address]

## 2017-08-01 NOTE — TELEPHONE ENCOUNTER
Pt called stating she received a call from her Disability people at her work and they have stopped her disability on 7/12. 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  Call back number [phone]

## 2017-09-12 ENCOUNTER — OFFICE VISIT (OUTPATIENT)
Dept: ONCOLOGY | Age: 52
End: 2017-09-12

## 2017-09-12 VITALS
RESPIRATION RATE: 18 BRPM | HEART RATE: 63 BPM | DIASTOLIC BLOOD PRESSURE: 81 MMHG | SYSTOLIC BLOOD PRESSURE: 131 MMHG | OXYGEN SATURATION: 98 % | HEIGHT: 65 IN | WEIGHT: 196 LBS | BODY MASS INDEX: 32.65 KG/M2 | TEMPERATURE: 97 F

## 2017-09-12 DIAGNOSIS — C50.912 MALIGNANT NEOPLASM OF LEFT FEMALE BREAST, UNSPECIFIED SITE OF BREAST: Primary | ICD-10-CM

## 2017-09-12 DIAGNOSIS — B35.1 TOENAIL FUNGUS: ICD-10-CM

## 2017-09-12 DIAGNOSIS — J32.9 CHRONIC SINUSITIS, UNSPECIFIED LOCATION: ICD-10-CM

## 2017-09-12 DIAGNOSIS — G62.9 NEUROPATHY: ICD-10-CM

## 2017-09-12 NOTE — PROGRESS NOTES
[employer]  [address] [place] [address]., [facility]  [address]  W: [phone]  F: [phone]     f/u HEME/ONC CONSULT    Reason for visit: evaluation for treatment for  Breast Cancer    Consulting physicians:  Dr. Ricardo Mixon; Dr. Kelsea Huff    HPI:   Rohini Choudhary is a 49 y.o.  female who is transferring her care for management of breast cancer. 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 3/9/16 left lumpectomy showed 1.3 cm of IDC, ER + at 82%, MD negative, HER 2 positive (IHC 3+; ratio 5; sig/cell 7.5),  0/2 LN involved, ki67 68%, gr 3, multiple satelite nodules up to 4 mm. [id number]. Was treated at [address] [place] [address] with Dr. Geovanna Barron. Received TH as in APT x 12 weeks (took 14 weeks due to neutropenia). Taxol was ended up DR by 30%. 4/22/16 this started, ended 7/22/16. Did have stomatitis with the outback herceptin. Started 8/19/16, completed 6/20/17    S/p XRT 10/7/16    Did have a bone density study, normal per pt -- done by Dr. Cameron Lozano. Anastrozole: 1/2017-3/17/17, stopped due to sinus issues    Interval history: In today for follow up. She is feeling great today. DX   Encounter Diagnosis   Name Primary?  Malignant neoplasm of left female breast, unspecified site of breast ([facility] Yes      Past Medical History   Diagnosis Date    Abnormal Pap smear 1990s     cryotherapy, no abnormals since    Contact dermatitis and other eczema, due to unspecified cause     Depression     Endometriosis     Malignant neoplasm of left female breast ([facility] 3/28/2016    Oligomenorrhea      currently not having menstrual cycles X 3 months     Past Surgical History   Procedure Laterality Date    Hx heent       scarring from sinusitis.     Hx gyn       cervical cryotherapy      Social History     Social History    Marital status:      Spouse name: N/A    Number of children: 1    Years of education: N/A Occupational History     [employer]      Social History Main Topics    Smoking status: Former Smoker    Smokeless tobacco: None    Alcohol use 1.0 oz/week     2 Cans of beer per week    Drug use: None    Sexual activity: Not Currently     Partners: Male     Birth control/ protection: Condom     Other Topics Concern    None     Social History Narrative     History reviewed. No pertinent family history. Current Outpatient Prescriptions   Medication Sig Dispense Refill    protein powd Take  by mouth daily.  Biotin 2,500 mcg cap Take  by mouth daily.  diazepam (VALIUM) 2 mg tablet Take 2 mg by mouth nightly as needed. 3    CYANOCOBALAMIN, VITAMIN B-12, (VITAMIN B-12 PO) Take  by mouth daily.  CHOLECALCIFEROL, VITAMIN D3, (VITAMIN D3 PO) Take 1,000 Units by mouth daily.  LACTOBACILLUS ACIDOPHILUS (PROBIOTIC PO) Take  by mouth daily.  MULTIVITAMIN (MULTIPLE VITAMIN PO) Take  by mouth daily.  anastrozole (ARIMIDEX) 1 mg tablet Take 1 Tab by mouth daily. 90 Tab 3    lidocaine-prilocaine (EMLA) topical cream Apply  to affected area as needed for Pain. 30 g 0    ibuprofen (ADVIL) 200 mg tablet Take 200 mg by mouth.  ondansetron (ZOFRAN ODT) 4 mg disintegrating tablet Take 1 Tab by mouth every eight (8) hours as needed for Nausea.  20 Tab 0     Facility-Administered Medications Ordered in Other Visits   Medication Dose Route Frequency Provider Last Rate Last Dose    sodium chloride 0.9 % injection 10 mL  10 mL IntraVENous PRN Wild Hardin MD        heparin (porcine) pf 500 Units  500 Units IntraVENous PRN Wild Hardin MD        sodium chloride (NS) flush 10-40 mL  10-40 mL IntraVENous PRN Wild Hardin MD        0.9% sodium chloride infusion  25 mL/hr IntraVENous CONTINUOUS Wild Hardin MD        diphenhydrAMINE (BENADRYL) capsule 50 mg  50 mg Oral ONCE Wild Hardin MD        trastuzumab (HERCEPTIN) 540 mg in 0.9% sodium chloride 250 mL, overfill volume 25 mL IVPB  540 mg IntraVENous ONCE Adama Moser MD         Allergies   Allergen Reactions    Augmentin [Amoxicillin-Pot Clavulanate] Nausea and Vomiting     Projectile vomiting, sweats, profuse diarrhea    Gluten Other (comments)     insensitity    Other Medication Hives     H2 antagonists,      Review of Systems    A comprehensive review of systems was performed and all systems were negative except for HPI and for the symptom review form, reviewed and scanned in.    Objective:  Visit Vitals    /84    Pulse 83    Temp 97.3 °F (36.3 °C) (Temporal)    Resp 18    Ht 5' 5\" (1.651 m)    Wt 201 lb 9.6 oz (91.4 kg)    SpO2 99%    BMI 33.55 kg/m2         General:  Alert, cooperative, no distress, appears stated age. Head:  Normocephalic, without obvious abnormality, atraumatic. Eyes:  Conjunctivae/corneas clear. PERRL, EOMs intact. Throat: Lips, mucosa, and tongue normal.    Neck: Supple, symmetrical, trachea midline, no adenopathy, thyroid: no enlargement/tenderness/nodules   Back:   Symmetric, no curvature. ROM normal. No CVA tenderness. Lungs:   Clear to auscultation bilaterally. Chest wall:  No tenderness or deformity. Heart:  Regular rate and rhythm, S1, S2 normal, no murmur, click, rub or gallop. Abdomen:   Soft, non-tender. Bowel sounds normal. No masses,  No organomegaly. Extremities: Extremities normal, atraumatic, no cyanosis or edema. Skin: Skin color, texture, turgor normal. No rashes or lesions. Neurologic: CNII-XII intact. Diagnostic Imaging   No results found for this or any previous visit.     Lab Results  Lab Results   Component Value Date/Time    WBC 6.5 10/03/2014 03:36 PM    HGB 13.6 10/03/2014 03:36 PM    HCT 41.2 10/03/2014 03:36 PM    PLATELET 072 06/92/6964 03:36 PM    MCV 91 10/03/2014 03:36 PM     Lab Results   Component Value Date/Time    Sodium 142 11/14/2016 04:12 PM    Potassium 3.8 11/14/2016 04:12 PM    Chloride 105 11/14/2016 04:12 PM    CO2 32 11/14/2016 04:12 PM    Anion gap 5 11/14/2016 04:12 PM    Glucose 111 11/14/2016 04:12 PM    BUN 10 11/14/2016 04:12 PM    Creatinine 0.77 11/14/2016 04:12 PM    BUN/Creatinine ratio 13 11/14/2016 04:12 PM    GFR est AA >60 11/14/2016 04:12 PM    GFR est non-AA >60 11/14/2016 04:12 PM    Calcium 8.5 11/14/2016 04:12 PM     Assessment/Plan:  49 y.o. female with L IDC, ER +, ME negative, HER 2 +, 1.3 cm, gr 3, 0/2 LN involved. PS 0    1. Left Breast cancer stage: IA    Hormonal therapy: administered    We explained to the patient that the goal of systemic adjuvant therapy is to improve the chances for cure and decrease the risk of relapse. We explained why a patient can have microscopic cancer spread now even though physical examination, laboratory studies and imaging studies are negative for cancer. We explained that the same treatments used now as adjuvant or preventive treatments rarely if ever are curative in women who develop metastases. Using PREDICT!, without therapy, her 10 year OS is 79%; with therapy that increases to 89%, endocrine therapy is 5% of that. She feels that she is still cycling, but not having periods. FSH, LH, and estradiol reviewed from 6/1/15, appears postmenopausal, but estradiol not < 6, but close. Checked her [address], LH, estradiol in 12/2016 which confirmed that she is postmenopausal. Has not had a period in 3 years. Outback Herceptin completed on 6/20/17. 6 month TTE due in January. TTE on 2/2/17, EF 69%. TTE on 4/10/17, EF 63%. Eliel mammogram and breast MRI in April with Dr. Hernandez Ontievros, negative per patient. I will reach out to Dr. Hernandez Ontiveros to discuss getting a mammogram and MRI at the same time each year. MRI breast in 2 weeks, already scheduled.      The risks and benefits of aromatase inhibitors (anastrozole, letrozole, and exemestane) were discussed in detail and the patient was informed of the following: Risks include the development of painful muscles and joints (arthralgia/myalgia) and bone loss. Muscle and joint pain can be severe but rarely result in any tissue damage; symptoms usually resolve in several weeks when the medication is stopped. Bone loss is common and a bone density test is recommended as a baseline and then yearly to every several years depending on initial results. The risk of fractures is increased by a few percent in patients taking these drugs, but careful monitoring of bone density and using bone protecting agents when indicated can minimize these risks. Unlike tamoxifen there is no increased risk of blood clots or endometrial cancer. AIs can cause or worsen vaginal dryness but women using these drugs should not use vaginal estrogen preparations for these symptoms. AIs can also cause or increase hot flashes. Any other symptoms should be reported. After this discussion she has declined endocrine therapy. I informed her of the benefit that this provided in regards to breast cancer recurrence. She verbalizes understanding, but still declines at this time. Will continue to discuss this at future visits. Not eligible for Neratinib due to < stage II. 2. Emotional well being: She has excellent support and is coping well with her disease. 3. 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 Mostly in fingers. Will monitor. 4. Weight gain/loss of appetite: down 4 lbs. Meredith Cisse RD has reached out to her. 5. Pharyngitis/Sinusitis: some improvement with stopping anastrozole. ENT, Dr. Charlena Schirmer following. S/p z alonso and doxycyline. She had a sinus CT which was rather unremarkable. Sinus surgery scheduled for 9/21/17. 6. Toenail loosening: on big toe. Could be due to treatment. Monitor. > 25 minutes were spent with this patient with > 50% of that time spent in face to face counseling.     Carina Scott MD

## 2017-09-12 NOTE — MR AVS SNAPSHOT
Visit Information Date & Time Provider Department Dept. Phone Encounter #  
 9/12/2017 11:30 AM Sarina Sevilla MD [facility] at [address] [place] [address] Rd [id number] Follow-up Instructions Return for 3m mikael evangelista. Upcoming Health Maintenance Date Due Pneumococcal 19-64 Highest Risk (1 of 3 - PCV13) 2/15/1985 DTaP/Tdap/Td series (1 - Tdap) 2/15/1987 FOBT Q 1 YEAR AGE 50-75 2/15/2016 INFLUENZA AGE 9 TO ADULT 8/1/2017 BREAST CANCER SCRN MAMMOGRAM 2/12/2018 PAP AKA CERVICAL CYTOLOGY 6/1/2018 Allergies as of 9/12/2017  Review Complete On: 9/12/2017 By: Margarita Guevara RN Severity Noted Reaction Type Reactions Augmentin [Amoxicillin-pot Clavulanate]  08/29/2013    Nausea and Vomiting Projectile vomiting, sweats, profuse diarrhea Gluten  09/24/2014    Other (comments)  
 insensitity Other Medication  06/24/2013    Hives H2 antagonists,   
  
Current Immunizations  Reviewed on 6/20/2017 No immunizations on file. Not reviewed this visit You Were Diagnosed With   
  
 Codes Comments Malignant neoplasm of left female breast, unspecified site of breast ([facility]    -  Primary ICD-10-CM: B82.899 ICD-9-CM: 174.9 Neuropathy ([facility]     ICD-10-CM: G62.9 ICD-9-CM: 155. 9 Chronic sinusitis, unspecified location     ICD-10-CM: J32.9 ICD-9-CM: 473.9 Toenail fungus     ICD-10-CM: B35.1 ICD-9-CM: 110.1 Vitals BP Pulse Temp Resp Height(growth percentile) Weight(growth percentile) 131/81 63 97 °F (36.1 °C) (Temporal) 18 5' 5\" (1.651 m) 196 lb (88.9 kg) SpO2 BMI OB Status Smoking Status 98% 32.62 kg/m2 Menopause Former Smoker Vitals History BMI and BSA Data Body Mass Index Body Surface Area  
 32.62 kg/m 2 2.02 m 2 Preferred Pharmacy Pharmacy Name Phone [id number] [address], [address] [phone] Your Updated Medication List  
  
   
 This list is accurate as of: 9/12/17  1:09 PM.  Always use your most recent med list. ADVIL 200 mg tablet Generic drug:  ibuprofen Take 200 mg by mouth as needed. diazePAM 2 mg tablet Commonly known as:  VALIUM Take 4 mg by mouth nightly as needed. Follow-up Instructions Return for 3m mikael evangelista. Introducing hospitals & [facility] SERVICES! [facility] introduces [employer] patient portal. Now you can access parts of your medical record, email your doctor's office, and request medication refills online. 1. In your internet browser, go to [email or web address] 2. Click on the First Time User? Click Here link in the Sign In box. You will see the New Member Sign Up page. 3. Enter your [employer] Access Code exactly as it appears below. You will not need to use this code after youve completed the sign-up process. If you do not sign up before the expiration date, you must request a new code. · [employer] Access Code: [email or web address] Expires: 12/11/2017  1:09 PM 
 
4. Enter the last four digits of your Social Security Number (xxxx) and Date of Birth (mm/dd/yyyy) as indicated and click Submit. You will be taken to the next sign-up page. 5. Create a [employer] ID. This will be your [employer] login ID and cannot be changed, so think of one that is secure and easy to remember. 6. Create a [employer] password. You can change your password at any time. 7. Enter your Password Reset Question and Answer. This can be used at a later time if you forget your password. 8. Enter your e-mail address. You will receive e-mail notification when new information is available in [address] 9. Click Sign Up. You can now view and download portions of your medical record. 10. Click the Download Summary menu link to download a portable copy of your medical information.  
 
If you have questions, please visit the Frequently Asked Questions section of the [employer]. Remember, [employer] is NOT to be used for urgent needs. For medical emergencies, dial 911. Now available from your iPhone and Android! Please provide this summary of care documentation to your next provider. Your primary care clinician is listed as Maxine Pagan. If you have any questions after today's visit, please call [phone].

## 2017-12-11 ENCOUNTER — OFFICE VISIT (OUTPATIENT)
Dept: ONCOLOGY | Age: 52
End: 2017-12-11

## 2017-12-11 VITALS
HEIGHT: 65 IN | BODY MASS INDEX: 32.82 KG/M2 | DIASTOLIC BLOOD PRESSURE: 75 MMHG | WEIGHT: 197 LBS | TEMPERATURE: 97.5 F | HEART RATE: 79 BPM | OXYGEN SATURATION: 100 % | RESPIRATION RATE: 18 BRPM | SYSTOLIC BLOOD PRESSURE: 134 MMHG

## 2017-12-11 DIAGNOSIS — R63.5 WEIGHT GAIN: ICD-10-CM

## 2017-12-11 DIAGNOSIS — Z51.81 ENCOUNTER FOR MONITORING CARDIOTOXIC DRUG THERAPY: ICD-10-CM

## 2017-12-11 DIAGNOSIS — J32.9 CHRONIC SINUSITIS, UNSPECIFIED LOCATION: ICD-10-CM

## 2017-12-11 DIAGNOSIS — Z79.899 ENCOUNTER FOR MONITORING CARDIOTOXIC DRUG THERAPY: ICD-10-CM

## 2017-12-11 DIAGNOSIS — Z17.0 MALIGNANT NEOPLASM OF LEFT BREAST IN FEMALE, ESTROGEN RECEPTOR POSITIVE, UNSPECIFIED SITE OF BREAST (HCC): Primary | ICD-10-CM

## 2017-12-11 DIAGNOSIS — C50.912 MALIGNANT NEOPLASM OF LEFT BREAST IN FEMALE, ESTROGEN RECEPTOR POSITIVE, UNSPECIFIED SITE OF BREAST (HCC): Primary | ICD-10-CM

## 2017-12-11 NOTE — PROGRESS NOTES
[address], [address] [place] [address] [address]  W: [phone]  F: [phone]     f/u HEME/ONC CONSULT    Reason for visit: evaluation for treatment for  Breast Cancer    Consulting physicians:  Dr. Scotty Weeks; Dr. Joy Padilla    HPI:   Suma Lynn is a 49 y.o.  female who is transferring her care for management of breast cancer. 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 3/9/16 left lumpectomy showed 1.3 cm of IDC, ER + at 82%, MT negative, HER 2 positive (IHC 3+; ratio 5; sig/cell 7.5),  0/2 LN involved, ki67 68%, gr 3, multiple satelite nodules up to 4 mm. [id number]. Was treated at [address] [place] [address] with Dr. Mark Miles. Received TH as in APT x 12 weeks (took 14 weeks due to neutropenia). Taxol was ended up DR by 30%. 4/22/16 this started, ended 7/22/16. Did have stomatitis with the outback herceptin. Started 8/19/16, completed 6/20/17    S/p XRT 10/7/16    Did have a bone density study, normal per pt -- done by Dr. Carol De León. Anastrozole: 1/2017-3/17/17, stopped due to sinus issues    Interval history: In today for follow up. Complains of gr 1 loss of appetite, gr 1 constipation, gr 1 fatigue, gr 1 hot flashes, gr 1 insomnia, gr 1 anxiety, gr 1-2 itching, gr 1 skin rash, gr 1 swelling, gr 1 urinary leakage, gr 1 libido, gr 1-2 vaginal dryness. She is feeling excellent today. She recently had her gallbladder taken out. She is seeing Dr. Judith Mulligan to check her thyroid levels. DX   Encounter Diagnosis   Name Primary?     Malignant neoplasm of left female breast, unspecified site of breast ([facility] Yes      Past Medical History   Diagnosis Date    Abnormal Pap smear 1990s     cryotherapy, no abnormals since    Contact dermatitis and other eczema, due to unspecified cause     Depression     Endometriosis     Malignant neoplasm of left female breast ([facility] 3/28/2016    Oligomenorrhea      currently not having menstrual cycles X 3 months     Past Surgical History   Procedure Laterality Date    Hx heent       scarring from sinusitis.  Hx gyn       cervical cryotherapy      Social History     Social History    Marital status:      Spouse name: N/A    Number of children: 1    Years of education: N/A     Occupational History     [employer]      Social History Main Topics    Smoking status: Former Smoker    Smokeless tobacco: None    Alcohol use 1.0 oz/week     2 Cans of beer per week    Drug use: None    Sexual activity: Not Currently     Partners: Male     Birth control/ protection: Condom     Other Topics Concern    None     Social History Narrative     History reviewed. No pertinent family history. Current Outpatient Prescriptions   Medication Sig Dispense Refill    protein powd Take  by mouth daily.  Biotin 2,500 mcg cap Take  by mouth daily.  diazepam (VALIUM) 2 mg tablet Take 2 mg by mouth nightly as needed. 3    CYANOCOBALAMIN, VITAMIN B-12, (VITAMIN B-12 PO) Take  by mouth daily.  CHOLECALCIFEROL, VITAMIN D3, (VITAMIN D3 PO) Take 1,000 Units by mouth daily.  LACTOBACILLUS ACIDOPHILUS (PROBIOTIC PO) Take  by mouth daily.  MULTIVITAMIN (MULTIPLE VITAMIN PO) Take  by mouth daily.  anastrozole (ARIMIDEX) 1 mg tablet Take 1 Tab by mouth daily. 90 Tab 3    lidocaine-prilocaine (EMLA) topical cream Apply  to affected area as needed for Pain. 30 g 0    ibuprofen (ADVIL) 200 mg tablet Take 200 mg by mouth.  ondansetron (ZOFRAN ODT) 4 mg disintegrating tablet Take 1 Tab by mouth every eight (8) hours as needed for Nausea.  20 Tab 0     Facility-Administered Medications Ordered in Other Visits   Medication Dose Route Frequency Provider Last Rate Last Dose    sodium chloride 0.9 % injection 10 mL  10 mL IntraVENous PRN Marianne Mace MD        heparin (porcine) pf 500 Units  500 Units IntraVENous PRN Marianne Mace MD        sodium chloride (NS) flush 10-40 mL  10-40 mL IntraVENous PRN Rodney Mercer MD        0.9% sodium chloride infusion  25 mL/hr IntraVENous CONTINUOUS Rodney Mercer MD        diphenhydrAMINE (BENADRYL) capsule 50 mg  50 mg Oral ONCE Rodney Mercer MD        trastuzumab (HERCEPTIN) 540 mg in 0.9% sodium chloride 250 mL, overfill volume 25 mL IVPB  540 mg IntraVENous ONCE Rodney Mercer MD         Allergies   Allergen Reactions    Augmentin [Amoxicillin-Pot Clavulanate] Nausea and Vomiting     Projectile vomiting, sweats, profuse diarrhea    Gluten Other (comments)     insensitity    Other Medication Hives     H2 antagonists,      Review of Systems    A comprehensive review of systems was performed and all systems were negative except for HPI and for the symptom review form, reviewed and scanned in.    Objective:  Visit Vitals    /84    Pulse 83    Temp 97.3 °F (36.3 °C) (Temporal)    Resp 18    Ht 5' 5\" (1.651 m)    Wt 201 lb 9.6 oz (91.4 kg)    SpO2 99%    BMI 33.55 kg/m2         General:  Alert, cooperative, no distress, appears stated age. Head:  Normocephalic, without obvious abnormality, atraumatic. Eyes:  Conjunctivae/corneas clear. PERRL, EOMs intact. Throat: Lips, mucosa, and tongue normal.    Neck: Supple, symmetrical, trachea midline, no adenopathy, thyroid: no enlargement/tenderness/nodules   Back:   Symmetric, no curvature. ROM normal. No CVA tenderness. Lungs:   Clear to auscultation bilaterally. Chest wall:  No tenderness or deformity. Heart:  Regular rate and rhythm, S1, S2 normal, no murmur, click, rub or gallop. Abdomen:   Soft, non-tender. Bowel sounds normal. No masses,  No organomegaly. Extremities: Extremities normal, atraumatic, no cyanosis or edema. Skin: Skin color, texture, turgor normal. No rashes or lesions. Neurologic: CNII-XII intact. Diagnostic Imaging   No results found for this or any previous visit.     Lab Results  Lab Results Component Value Date/Time    WBC 6.5 10/03/2014 03:36 PM    HGB 13.6 10/03/2014 03:36 PM    HCT 41.2 10/03/2014 03:36 PM    PLATELET 204 92/72/8803 03:36 PM    MCV 91 10/03/2014 03:36 PM     Lab Results   Component Value Date/Time    Sodium 142 11/14/2016 04:12 PM    Potassium 3.8 11/14/2016 04:12 PM    Chloride 105 11/14/2016 04:12 PM    CO2 32 11/14/2016 04:12 PM    Anion gap 5 11/14/2016 04:12 PM    Glucose 111 11/14/2016 04:12 PM    BUN 10 11/14/2016 04:12 PM    Creatinine 0.77 11/14/2016 04:12 PM    BUN/Creatinine ratio 13 11/14/2016 04:12 PM    GFR est AA >60 11/14/2016 04:12 PM    GFR est non-AA >60 11/14/2016 04:12 PM    Calcium 8.5 11/14/2016 04:12 PM     Assessment/Plan:  49 y.o. female with L IDC, ER +, NM negative, HER 2 +, 1.3 cm, gr 3, 0/2 LN involved. PS 0    1. Left Breast cancer stage: IA    Hormonal therapy: administered    We explained to the patient that the goal of systemic adjuvant therapy is to improve the chances for cure and decrease the risk of relapse. We explained why a patient can have microscopic cancer spread now even though physical examination, laboratory studies and imaging studies are negative for cancer. We explained that the same treatments used now as adjuvant or preventive treatments rarely if ever are curative in women who develop metastases. Using PREDICT!, without therapy, her 10 year OS is 79%; with therapy that increases to 89%, endocrine therapy is 5% of that. She feels that she is still cycling, but not having periods. FSH, LH, and estradiol reviewed from 6/1/15, appears postmenopausal, but estradiol not < 6, but close. Checked her [address], LH, estradiol in 12/2016 which confirmed that she is postmenopausal. Has not had a period in 3 years. Outback Herceptin completed on 6/20/17. 6 month TTE due in January, ordered. TTE on 2/2/17, EF 69%. TTE on 4/10/17, EF 63%. Eliel mammogram in April and MRI in October with Dr. Dexter Bobby, negative per patient.  Saw  Roshni Nuno last week. The risks and benefits of aromatase inhibitors (anastrozole, letrozole, and exemestane) were discussed in detail and the patient was informed of the following: Risks include the development of painful muscles and joints (arthralgia/myalgia) and bone loss. Muscle and joint pain can be severe but rarely result in any tissue damage; symptoms usually resolve in several weeks when the medication is stopped. Bone loss is common and a bone density test is recommended as a baseline and then yearly to every several years depending on initial results. The risk of fractures is increased by a few percent in patients taking these drugs, but careful monitoring of bone density and using bone protecting agents when indicated can minimize these risks. Unlike tamoxifen there is no increased risk of blood clots or endometrial cancer. AIs can cause or worsen vaginal dryness but women using these drugs should not use vaginal estrogen preparations for these symptoms. AIs can also cause or increase hot flashes. Any other symptoms should be reported. Again, after this discussion she has declined endocrine therapy. I informed her of the benefit that this provided in regards to breast cancer recurrence. She verbalizes understanding, but still declines at this time. Will continue to discuss this at future visits. Not eligible for Neratinib due to < stage II. 2. Emotional well being: She has excellent support and is coping well with her disease. 3. Neuropathy: resolved. 4. Weight gain/loss of appetite: down 3-4 lbs. Toshia Garcia RD has reached out to her. 5. Pharyngitis/Sinusitis: much improved. ENT, Dr. Edenilson Jaime following. S/p z alonso and doxycyline. She had a sinus CT which was rather unremarkable. 6. Toenail loosening: resolved. > 25 minutes were spent with this patient with > 50% of that time spent in face to face counseling.     Adriana Barboza MD

## 2017-12-11 NOTE — MR AVS SNAPSHOT
Visit Information Date & Time Provider Department Dept. Phone Encounter #  
 12/11/2017 11:30 AM Nacho Awad MD [facility] at [address] [place] [address] Rd [id number] Follow-up Instructions Return for 3-4m mikael evangelista. Follow-up and Disposition History Upcoming Health Maintenance Date Due Pneumococcal 19-64 Highest Risk (1 of 3 - PCV13) 2/15/1985 DTaP/Tdap/Td series (1 - Tdap) 2/15/1987 FOBT Q 1 YEAR AGE 50-75 2/15/2016 Influenza Age 5 to Adult 8/1/2017 BREAST CANCER SCRN MAMMOGRAM 2/12/2018 PAP AKA CERVICAL CYTOLOGY 6/1/2018 Allergies as of 12/11/2017  Review Complete On: 12/11/2017 By: Nacho Awad MD  
  
 Severity Noted Reaction Type Reactions Augmentin [Amoxicillin-pot Clavulanate]  08/29/2013    Nausea and Vomiting Projectile vomiting, sweats, profuse diarrhea Gluten  09/24/2014    Other (comments)  
 insensitity Other Medication  06/24/2013    Hives H2 antagonists,   
  
Current Immunizations  Reviewed on 6/20/2017 No immunizations on file. Not reviewed this visit You Were Diagnosed With   
  
 Codes Comments Malignant neoplasm of left breast in female, estrogen receptor positive, unspecified site of breast ([facility] [address]    -  Primary ICD-10-CM: C50.912, Z17.0 ICD-9-CM: 174.9, V86.0 Chronic sinusitis, unspecified location     ICD-10-CM: J32.9 ICD-9-CM: 473.9 Weight gain     ICD-10-CM: R63.5 ICD-9-CM: 783.1 Encounter for monitoring cardiotoxic drug therapy     ICD-10-CM: Z51.81, Z79.899 ICD-9-CM: V58.83, V58.69 Vitals BP Pulse Temp Resp Height(growth percentile) Weight(growth percentile) 134/75 79 97.5 °F (36.4 °C) (Temporal) 18 5' 5\" (1.651 m) 197 lb (89.4 kg) SpO2 BMI OB Status Smoking Status 100% 32.78 kg/m2 Menopause Former Smoker Vitals History BMI and BSA Data Body Mass Index Body Surface Area 32.78 kg/m 2 2.02 m 2 Preferred Pharmacy Pharmacy Name Phone [id number] [address], [address] [phone] Your Updated Medication List  
  
   
This list is accurate as of: 12/11/17  1:47 PM.  Always use your most recent med list. ADVIL 200 mg tablet Generic drug:  ibuprofen Take 200 mg by mouth as needed. diazePAM 2 mg tablet Commonly known as:  VALIUM Take 4 mg by mouth nightly as needed. Follow-up Instructions Return for 3-4m mikael evangelista. To-Do List   
 01/22/2018 ECHO:  2D ECHO COMPLETE ADULT (TTE) W OR WO CONTR Introducing [facility]! [facility] introduces [employer] patient portal. Now you can access parts of your medical record, email your doctor's office, and request medication refills online. 1. In your internet browser, go to [email or web address] 2. Click on the First Time User? Click Here link in the Sign In box. You will see the New Member Sign Up page. 3. Enter your [employer] Access Code exactly as it appears below. You will not need to use this code after youve completed the sign-up process. If you do not sign up before the expiration date, you must request a new code. · [employer] Access Code: [email or web address] Expires: 3/11/2018  1:47 PM 
 
4. Enter the last four digits of your Social Security Number (xxxx) and Date of Birth (mm/dd/yyyy) as indicated and click Submit. You will be taken to the next sign-up page. 5. Create a [employer] ID. This will be your [employer] login ID and cannot be changed, so think of one that is secure and easy to remember. 6. Create a [employer] password. You can change your password at any time. 7. Enter your Password Reset Question and Answer. This can be used at a later time if you forget your password. 8. Enter your e-mail address. You will receive e-mail notification when new information is available in [address] 9. Click Sign Up.  You can now view and download portions of your medical record. 10. Click the Download Summary menu link to download a portable copy of your medical information. If you have questions, please visit the Frequently Asked Questions section of the [employer] website. Remember, [employer] is NOT to be used for urgent needs. For medical emergencies, dial 911. Now available from your iPhone and Android! Please provide this summary of care documentation to your next provider. Your primary care clinician is listed as Emi Newman. If you have any questions after today's visit, please call [phone].

## 2018-04-09 ENCOUNTER — OFFICE VISIT (OUTPATIENT)
Dept: ONCOLOGY | Age: 53
End: 2018-04-09

## 2018-04-09 VITALS
HEIGHT: 65 IN | TEMPERATURE: 98 F | OXYGEN SATURATION: 97 % | WEIGHT: 198.6 LBS | SYSTOLIC BLOOD PRESSURE: 131 MMHG | BODY MASS INDEX: 33.09 KG/M2 | DIASTOLIC BLOOD PRESSURE: 76 MMHG | HEART RATE: 82 BPM | RESPIRATION RATE: 18 BRPM

## 2018-04-09 DIAGNOSIS — C50.912 MALIGNANT NEOPLASM OF LEFT FEMALE BREAST, UNSPECIFIED ESTROGEN RECEPTOR STATUS, UNSPECIFIED SITE OF BREAST (HCC): Primary | ICD-10-CM

## 2018-04-09 DIAGNOSIS — L60.9 NAIL ABNORMALITIES: ICD-10-CM

## 2018-04-09 DIAGNOSIS — R41.9 COGNITIVE COMPLAINTS: ICD-10-CM

## 2018-04-09 NOTE — MR AVS SNAPSHOT
[address] 
 
 
 [address] [place] [address], [facility] [address] 
[phone] Patient: Alvino Morales MRN: [id number] Visit Information Date & Time Provider Department Dept. Phone Encounter #  
 4/9/2018 11:00 AM Rigo Novak MD [facility] at [address] [place] [address] [id number] Follow-up Instructions Return in 6 months (on 10/9/2018) for S/I - 6 month breast f/u. Follow-up and Disposition History Your Appointments 10/8/2018  1:45 PM  
Any with Amari Tenorio NP  
[address] [facility] at [address]) Appt Note: S/I - 6 month breast f/u  
 [address] [facility], [address] [facility] [address] [address]  
[phone]  
  
   
 [address] [place] [address], [address] [place] [address] Upcoming Health Maintenance Date Due Pneumococcal 19-64 Highest Risk (1 of 3 - PCV13) 2/15/1985 DTaP/Tdap/Td series (1 - Tdap) 2/15/1987 FOBT Q 1 YEAR AGE 50-75 2/15/2016 Influenza Age 5 to Adult 8/1/2017 BREAST CANCER SCRN MAMMOGRAM 2/12/2018 PAP AKA CERVICAL CYTOLOGY 6/1/2018 Allergies as of 4/9/2018  Review Complete On: 4/9/2018 By: Rigo Novak MD  
  
 Severity Noted Reaction Type Reactions Augmentin [Amoxicillin-pot Clavulanate]  08/29/2013    Nausea and Vomiting Projectile vomiting, sweats, profuse diarrhea Gluten  09/24/2014    Other (comments)  
 insensitity Other Medication  06/24/2013    Hives H2 antagonists,   
  
Current Immunizations  Reviewed on 6/20/2017 No immunizations on file. Not reviewed this visit You Were Diagnosed With   
  
 Codes Comments Malignant neoplasm of left female breast, unspecified estrogen receptor status, unspecified site of [facility]    -  Primary ICD-10-CM: Y16.441 ICD-9-CM: 174.9 Nail abnormalities     ICD-10-CM: L60.9 ICD-9-CM: 703.9 Cognitive complaints     ICD-10-CM: R41.9 ICD-9-CM: 799.59 Vitals BP Pulse Temp Resp Height(growth percentile) Weight(growth percentile) 131/76 82 98 °F (36.7 °C) (Temporal) 18 5' 5\" (1.651 m) 198 lb 9.6 oz (90.1 kg) SpO2 BMI OB Status Smoking Status 97% 33.05 kg/m2 Menopause Former Smoker Vitals History BMI and BSA Data Body Mass Index Body Surface Area 33.05 kg/m 2 2.03 m 2 Preferred Pharmacy Pharmacy Name Phone [id number] [address], [address] [phone] Your Updated Medication List  
  
   
This list is accurate as of 4/9/18 12:39 PM.  Always use your most recent med list. ADVIL 200 mg tablet Generic drug:  ibuprofen Take 200 mg by mouth as needed. Follow-up Instructions Return in 6 months (on 10/9/2018) for S/I - 6 month breast f/u. Introducing [facility]! Dear Leonides Patel: 
Thank you for requesting a [employer] account. Our records indicate that you already have an active [employer] account. You can access your account anytime at [email or web address] Did you know that you can access your hospital and ER discharge instructions at any time in [employer]? You can also review all of your test results from your hospital stay or ER visit. Additional Information If you have questions, please visit the Frequently Asked Questions section of the [employer] website at [email or web address] Remember, [employer] is NOT to be used for urgent needs. For medical emergencies, dial 911. Now available from your iPhone and Android! Please provide this summary of care documentation to your next provider. Your primary care clinician is listed as Raphael Cabrales. If you have any questions after today's visit, please call [phone].

## 2018-04-09 NOTE — PROGRESS NOTES
[address], [address] [facility] [address]  W: [phone]  F: [phone]     f/u HEME/ONC CONSULT    Reason for visit: evaluation for treatment for  Breast Cancer    Consulting physicians:  Dr. Ricardo Mixon; Dr. Kelsea Huff    HPI:   Rohini Choudhary is a 49 y.o.  female who is transferring her care for management of breast cancer. 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 3/9/16 left lumpectomy showed 1.3 cm of IDC, ER + at 82%, WA negative, HER 2 positive (IHC 3+; ratio 5; sig/cell 7.5),  0/2 LN involved, ki67 68%, gr 3, multiple satelite nodules up to 4 mm. [id number]. Was treated at [address] [place] [address] with Dr. Geovanna Barron. Received TH as in APT x 12 weeks (took 14 weeks due to neutropenia). Taxol was ended up DR by 30%. 4/22/16 this started, ended 7/22/16. Did have stomatitis with the outback herceptin. Started 8/19/16, completed 6/20/17    S/p XRT 10/7/16    Did have a bone density study, normal per pt -- done by Dr. Cameron Lozano. Anastrozole: 1/2017-3/17/17, stopped due to sinus issues    Interval history: In today for follow up. She states she is doing okay. Had her gallbladder removed. She states she sometimes gets hives and isn't sure why. Finger nails still splitting some. She states she has some cognitive issues - sometimes forgets her line of thinking. Reports: gr 1 constipation, gr 1 fatigue, gr 1 chills, gr 1 hot flashes, gr 1 cognition, gr 1 concentration, gr 1 anxiety/depression, gr 1 rash, gr 1 neuropathy, gr 1 urinary leakage, gr 1 vaginal dryness. DX   Encounter Diagnosis   Name Primary?     Malignant neoplasm of left female breast, unspecified site of breast ([facility] [address] Yes      Past Medical History   Diagnosis Date    Abnormal Pap smear 1990s     cryotherapy, no abnormals since    Contact dermatitis and other eczema, due to unspecified cause     Depression     Endometriosis     Malignant neoplasm of left female breast ([facility] [address] 3/28/2016    Oligomenorrhea      currently not having menstrual cycles X 3 months     Past Surgical History   Procedure Laterality Date    Hx heent       scarring from sinusitis.  Hx gyn       cervical cryotherapy      Social History     Social History    Marital status:      Spouse name: N/A    Number of children: 1    Years of education: N/A     Occupational History     [employer]      Social History Main Topics    Smoking status: Former Smoker    Smokeless tobacco: None    Alcohol use 1.0 oz/week     2 Cans of beer per week    Drug use: None    Sexual activity: Not Currently     Partners: Male     Birth control/ protection: Condom     Other Topics Concern    None     Social History Narrative     History reviewed. No pertinent family history. Current Outpatient Prescriptions   Medication Sig Dispense Refill    protein powd Take  by mouth daily.  Biotin 2,500 mcg cap Take  by mouth daily.  diazepam (VALIUM) 2 mg tablet Take 2 mg by mouth nightly as needed. 3    CYANOCOBALAMIN, VITAMIN B-12, (VITAMIN B-12 PO) Take  by mouth daily.  CHOLECALCIFEROL, VITAMIN D3, (VITAMIN D3 PO) Take 1,000 Units by mouth daily.  LACTOBACILLUS ACIDOPHILUS (PROBIOTIC PO) Take  by mouth daily.  MULTIVITAMIN (MULTIPLE VITAMIN PO) Take  by mouth daily.  anastrozole (ARIMIDEX) 1 mg tablet Take 1 Tab by mouth daily. 90 Tab 3    lidocaine-prilocaine (EMLA) topical cream Apply  to affected area as needed for Pain. 30 g 0    ibuprofen (ADVIL) 200 mg tablet Take 200 mg by mouth.  ondansetron (ZOFRAN ODT) 4 mg disintegrating tablet Take 1 Tab by mouth every eight (8) hours as needed for Nausea.  20 Tab 0     Facility-Administered Medications Ordered in Other Visits   Medication Dose Route Frequency Provider Last Rate Last Dose    sodium chloride 0.9 % injection 10 mL  10 mL IntraVENous PRN Yana Caro MD        heparin (porcine) pf 500 Units  500 Units IntraVENous PRN Inés Barron MD        sodium chloride (NS) flush 10-40 mL  10-40 mL IntraVENous PRN Inés Barron MD        0.9% sodium chloride infusion  25 mL/hr IntraVENous CONTINUOUS Inés Barron MD        diphenhydrAMINE (BENADRYL) capsule 50 mg  50 mg Oral ONCE Inés Barron MD        trastuzumab (HERCEPTIN) 540 mg in 0.9% sodium chloride 250 mL, overfill volume 25 mL IVPB  540 mg IntraVENous ONCE Inés Barron MD         Allergies   Allergen Reactions    Augmentin [Amoxicillin-Pot Clavulanate] Nausea and Vomiting     Projectile vomiting, sweats, profuse diarrhea    Gluten Other (comments)     insensitity    Other Medication Hives     H2 antagonists,      Review of Systems    A comprehensive review of systems was performed and all systems were negative except for HPI and for the symptom review form, reviewed and scanned in.    Objective:  Visit Vitals    /84    Pulse 83    Temp 97.3 °F (36.3 °C) (Temporal)    Resp 18    Ht 5' 5\" (1.651 m)    Wt 201 lb 9.6 oz (91.4 kg)    SpO2 99%    BMI 33.55 kg/m2         General:  Alert, cooperative, no distress, appears stated age. Head:  Normocephalic, without obvious abnormality, atraumatic. Eyes:  Conjunctivae/corneas clear. PERRL, EOMs intact. Throat: Lips, mucosa, and tongue normal.    Neck: Supple, symmetrical, trachea midline, no adenopathy, thyroid: no enlargement/tenderness/nodules   Back:   Symmetric, no curvature. ROM normal. No CVA tenderness. Lungs:   Clear to auscultation bilaterally. Chest wall:  No tenderness or deformity. Heart:  Regular rate and rhythm, S1, S2 normal, no murmur, click, rub or gallop. Abdomen:   Soft, non-tender. Bowel sounds normal. No masses,  No organomegaly. Extremities: Extremities normal, atraumatic, no cyanosis or edema. Skin: Skin color, texture, turgor normal. No rashes or lesions. Neurologic: CNII-XII intact.        Diagnostic Imaging No results found for this or any previous visit. Lab Results  Lab Results   Component Value Date/Time    WBC 6.5 10/03/2014 03:36 PM    HGB 13.6 10/03/2014 03:36 PM    HCT 41.2 10/03/2014 03:36 PM    PLATELET 347 04/07/4829 03:36 PM    MCV 91 10/03/2014 03:36 PM     Lab Results   Component Value Date/Time    Sodium 142 11/14/2016 04:12 PM    Potassium 3.8 11/14/2016 04:12 PM    Chloride 105 11/14/2016 04:12 PM    CO2 32 11/14/2016 04:12 PM    Anion gap 5 11/14/2016 04:12 PM    Glucose 111 11/14/2016 04:12 PM    BUN 10 11/14/2016 04:12 PM    Creatinine 0.77 11/14/2016 04:12 PM    BUN/Creatinine ratio 13 11/14/2016 04:12 PM    GFR est AA >60 11/14/2016 04:12 PM    GFR est non-AA >60 11/14/2016 04:12 PM    Calcium 8.5 11/14/2016 04:12 PM     Assessment/Plan:  49 y.o. female with L IDC, ER +, UT negative, HER 2 +, 1.3 cm, gr 3, 0/2 LN involved. PS 0    1. Left Breast cancer stage: IA    Hormonal therapy: administered    We explained to the patient that the goal of systemic adjuvant therapy is to improve the chances for cure and decrease the risk of relapse. We explained why a patient can have microscopic cancer spread now even though physical examination, laboratory studies and imaging studies are negative for cancer. We explained that the same treatments used now as adjuvant or preventive treatments rarely if ever are curative in women who develop metastases. Using PREDICT!, without therapy, her 10 year OS is 79%; with therapy that increases to 89%, endocrine therapy is 5% of that. She feels that she is still cycling, but not having periods. FSH, LH, and estradiol reviewed from 6/1/15, appears postmenopausal, but estradiol not < 6, but close. Checked her [address], LH, estradiol in 12/2016 which confirmed that she is postmenopausal. Has not had a period in 3 years. Outback Herceptin completed on 6/20/17. 6 month TTE due in January, ordered. TTE on 2/2/17, EF 69%. TTE on 4/10/17, EF 63%.      Eliel mammogram in April and MRI in October with Dr. Ken De La O, negative per patient. She is due for her mammogram, will contact Dr. Jadiel Thakur office. I recommend having the MRI at the same time as her mammogram as there is no evidence supporting q6 month imaging. The risks and benefits of aromatase inhibitors (anastrozole, letrozole, and exemestane) were discussed in detail and the patient was informed of the following: Risks include the development of painful muscles and joints (arthralgia/myalgia) and bone loss. Muscle and joint pain can be severe but rarely result in any tissue damage; symptoms usually resolve in several weeks when the medication is stopped. Bone loss is common and a bone density test is recommended as a baseline and then yearly to every several years depending on initial results. The risk of fractures is increased by a few percent in patients taking these drugs, but careful monitoring of bone density and using bone protecting agents when indicated can minimize these risks. Unlike tamoxifen there is no increased risk of blood clots or endometrial cancer. AIs can cause or worsen vaginal dryness but women using these drugs should not use vaginal estrogen preparations for these symptoms. AIs can also cause or increase hot flashes. Any other symptoms should be reported. Again, after this discussion she has declined endocrine therapy. I informed her of the benefit that this provided in regards to breast cancer recurrence. She verbalizes understanding, but still declines at this time. Will continue to discuss this at future visits. Not eligible for Neratinib due to < stage II. 2. Emotional well being: She has excellent support and is coping well with her disease. 3. Neuropathy: resolved. 4. Weight gain/loss of appetite: down 3-4 lbs. Zohreh Baugh RD has reached out to her. 5. Pharyngitis/Sinusitis: much improved. ENT, Dr. Powell Scale following. S/p z alonso and doxycyline.  She had a sinus CT which was rather unremarkable. 6. Cognitive issues: Likely related to anxiety and prior therapy. Discussed referral to neuropsych-Dr. Trinidad Adame but she declines for now.     > 25 minutes were spent with this patient with > 50% of that time spent in face to face counseling.     Misael Ureña MD

## 2018-10-08 ENCOUNTER — OFFICE VISIT (OUTPATIENT)
Dept: ONCOLOGY | Age: 53
End: 2018-10-08

## 2018-10-08 VITALS
HEIGHT: 65 IN | DIASTOLIC BLOOD PRESSURE: 77 MMHG | SYSTOLIC BLOOD PRESSURE: 113 MMHG | HEART RATE: 80 BPM | OXYGEN SATURATION: 97 % | TEMPERATURE: 97 F

## 2018-10-08 DIAGNOSIS — R41.9 COGNITIVE COMPLAINTS: ICD-10-CM

## 2018-10-08 DIAGNOSIS — J32.9 CHRONIC SINUSITIS, UNSPECIFIED LOCATION: ICD-10-CM

## 2018-10-08 DIAGNOSIS — C50.912 MALIGNANT NEOPLASM OF LEFT FEMALE BREAST, UNSPECIFIED ESTROGEN RECEPTOR STATUS, UNSPECIFIED SITE OF BREAST (HCC): Primary | ICD-10-CM

## 2018-10-08 RX ORDER — DIAZEPAM 2 MG/1
2 TABLET ORAL AS NEEDED
Refills: 3 | COMMUNITY
Start: 2018-07-11

## 2018-10-08 NOTE — PROGRESS NOTES
[address], [address] [facility]  [address]  W: [phone]  F: [phone]     f/u HEME/ONC CONSULT    Reason for visit: evaluation for treatment for  Breast Cancer    Consulting physicians:  Dr. Siva Lopez; Dr. Jenny Andujar    HPI:   Kartik Yanez is a 49 y.o.  female who is transferring her care for management of breast cancer. 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 3/9/16 left lumpectomy showed 1.3 cm of IDC, ER + at 82%, LA negative, HER 2 positive (IHC 3+; ratio 5; sig/cell 7.5),  0/2 LN involved, ki67 68%, gr 3, multiple satelite nodules up to 4 mm. [id number]. Was treated at [address] [place] [address] with Dr. Solo Reyes. Received TH as in APT x 12 weeks (took 14 weeks due to neutropenia). Taxol was ended up DR by 30%. 4/22/16 this started, ended 7/22/16. Did have stomatitis with the outback herceptin. Started 8/19/16, completed 6/20/17    S/p XRT 10/7/16    Did have a bone density study, normal per pt -- done by Dr. Masha Olivarez. Anastrozole: 1/2017-3/17/17, stopped due to sinus issues    Interval history: In today for follow up. She is feeling well today. DX   Encounter Diagnosis   Name Primary?  Malignant neoplasm of left female breast, unspecified site of breast ([facility] [address] Yes      Past Medical History   Diagnosis Date    Abnormal Pap smear 1990s     cryotherapy, no abnormals since    Contact dermatitis and other eczema, due to unspecified cause     Depression     Endometriosis     Malignant neoplasm of left female breast ([facility] [address] 3/28/2016    Oligomenorrhea      currently not having menstrual cycles X 3 months     Past Surgical History   Procedure Laterality Date    Hx heent       scarring from sinusitis.     Hx gyn       cervical cryotherapy      Social History     Social History    Marital status:      Spouse name: N/A    Number of children: 1    Years of education: N/A Occupational History     [employer]      Social History Main Topics    Smoking status: Former Smoker    Smokeless tobacco: None    Alcohol use 1.0 oz/week     2 Cans of beer per week    Drug use: None    Sexual activity: Not Currently     Partners: Male     Birth control/ protection: Condom     Other Topics Concern    None     Social History Narrative     History reviewed. No pertinent family history. Allergies   Allergen Reactions    Augmentin [Amoxicillin-Pot Clavulanate] Nausea and Vomiting     Projectile vomiting, sweats, profuse diarrhea    Gluten Other (comments)     insensitity    Other Medication Hives     H2 antagonists,          Current Outpatient Prescriptions:     diazePAM (VALIUM) 2 mg tablet, Take 2 mg by mouth as needed. , Disp: , Rfl: 3    ibuprofen (ADVIL) 200 mg tablet, Take 200 mg by mouth as needed. , Disp: , Rfl:     Review of Systems    A comprehensive review of systems was performed and all systems were negative except for HPI and for the symptom review form, reviewed and scanned in.    Objective:  Visit Vitals    /84    Pulse 83    Temp 97.3 °F (36.3 °C) (Temporal)    Resp 18    Ht 5' 5\" (1.651 m)    Wt 201 lb 9.6 oz (91.4 kg)    SpO2 99%    BMI 33.55 kg/m2         General:  Alert, cooperative, no distress, appears stated age. Head:  Normocephalic, without obvious abnormality, atraumatic. Eyes:  Conjunctivae/corneas clear. PERRL, EOMs intact. Throat: Lips, mucosa, and tongue normal.    Neck: Supple, symmetrical, trachea midline, no adenopathy, thyroid: no enlargement/tenderness/nodules   Back:   Symmetric, no curvature. ROM normal. No CVA tenderness. Lungs:   Clear to auscultation bilaterally. Chest wall:  No tenderness or deformity. Heart:  Regular rate and rhythm, S1, S2 normal, no murmur, click, rub or gallop. Abdomen:   Soft, non-tender. Bowel sounds normal. No masses,  No organomegaly.    Extremities: Extremities normal, atraumatic, no cyanosis or edema. Skin: Skin color, texture, turgor normal. No rashes or lesions. Diagnostic Imaging   No results found for this or any previous visit. Lab Results  Lab Results   Component Value Date/Time    WBC 6.5 10/03/2014 03:36 PM    HGB 13.6 10/03/2014 03:36 PM    HCT 41.2 10/03/2014 03:36 PM    PLATELET 075 94/06/7586 03:36 PM    MCV 91 10/03/2014 03:36 PM    ABS. NEUTROPHILS 3.7 10/03/2014 03:36 PM    Hemoglobin (POC) 14.6 07/17/2013 10:13 PM    Hematocrit (POC) 43 07/17/2013 10:13 PM     Lab Results   Component Value Date/Time    Sodium 142 11/14/2016 04:12 PM    Potassium 3.8 11/14/2016 04:12 PM    Chloride 105 11/14/2016 04:12 PM    CO2 32 11/14/2016 04:12 PM    Glucose 111 (H) 11/14/2016 04:12 PM    BUN 10 11/14/2016 04:12 PM    Creatinine 0.77 11/14/2016 04:12 PM    GFR est AA >60 11/14/2016 04:12 PM    GFR est non-AA >60 11/14/2016 04:12 PM    Calcium 8.5 11/14/2016 04:12 PM    Sodium (POC) 140 07/17/2013 10:13 PM    Potassium (POC) 3.3 (L) 07/17/2013 10:13 PM    Chloride (POC) 101 07/17/2013 10:13 PM    Glucose (POC) 101 07/17/2013 10:13 PM    BUN (POC) 7 (L) 07/17/2013 10:13 PM    Creatinine (POC) 0.9 07/17/2013 10:13 PM    Calcium, ionized (POC) 1.11 (L) 07/17/2013 10:13 PM     No results found for: TBILI, ALT, SGOT, AP, TP, ALB, GLOB          Assessment/Plan:  49 y.o. female with L IDC, ER +, [place] negative, HER 2 +, 1.3 cm, gr 3, 0/2 LN involved. PS 0    1. Left Breast cancer stage: IA    Hormonal therapy: administered    We explained to the patient that the goal of systemic adjuvant therapy is to improve the chances for cure and decrease the risk of relapse. We explained why a patient can have microscopic cancer spread now even though physical examination, laboratory studies and imaging studies are negative for cancer. We explained that the same treatments used now as adjuvant or preventive treatments rarely if ever are curative in women who develop metastases.     Using PREDICT!, without therapy, her 10 year OS is 79%; with therapy that increases to 89%, endocrine therapy is 5% of that. She feels that she is still cycling, but not having periods. FSH, LH, and estradiol reviewed from 6/1/15, appears postmenopausal, but estradiol not < 6, but close. Checked her [address], LH, estradiol in 12/2016 which confirmed that she is postmenopausal. Has not had a period in 3 years. Outback Herceptin completed on 6/20/17. 6 month TTE 10/2017, EF 60%. TTE on 2/2/17, EF 69%. TTE on 4/10/17, EF 63%. The risks and benefits of aromatase inhibitors (anastrozole, letrozole, and exemestane) were discussed in detail and the patient was informed of the following: Risks include the development of painful muscles and joints (arthralgia/myalgia) and bone loss. Muscle and joint pain can be severe but rarely result in any tissue damage; symptoms usually resolve in several weeks when the medication is stopped. Bone loss is common and a bone density test is recommended as a baseline and then yearly to every several years depending on initial results. The risk of fractures is increased by a few percent in patients taking these drugs, but careful monitoring of bone density and using bone protecting agents when indicated can minimize these risks. Unlike tamoxifen there is no increased risk of blood clots or endometrial cancer. AIs can cause or worsen vaginal dryness but women using these drugs should not use vaginal estrogen preparations for these symptoms. AIs can also cause or increase hot flashes. Any other symptoms should be reported. Again, after this discussion she has declined endocrine therapy. I informed her of the benefit that this provided in regards to breast cancer recurrence. She verbalizes understanding, but still declines at this time. Will continue to discuss this at future visits. Not eligible for Neratinib due to < stage II.       Eliel mammogram in April and MRI in October with Dr. Dedra Montilla, with microcalcification, they decided to watch. She will have another alex mammogram and MRI before the end of the year. Will request results. 2. Emotional well being: She has excellent support and is coping well with her disease. 3. Pharyngitis/Sinusitis: much improved. ENT, Dr. Gutierrez Cain following. She had a sinus CT which was rather unremarkable. 4. Cognitive issues: Likely related to anxiety and prior therapy. Discussed referral to neuropsych-Dr. Odell Quintana but she declines for now.     > 25 minutes were spent with this patient with > 50% of that time spent in face to face counseling.     Lita Zamora MD

## 2019-04-15 ENCOUNTER — OFFICE VISIT (OUTPATIENT)
Dept: ONCOLOGY | Age: 54
End: 2019-04-15

## 2019-04-15 VITALS
OXYGEN SATURATION: 99 % | HEIGHT: 65 IN | SYSTOLIC BLOOD PRESSURE: 151 MMHG | TEMPERATURE: 96.6 F | DIASTOLIC BLOOD PRESSURE: 95 MMHG | RESPIRATION RATE: 17 BRPM | BODY MASS INDEX: 33.05 KG/M2 | HEART RATE: 73 BPM

## 2019-04-15 DIAGNOSIS — C50.912 MALIGNANT NEOPLASM OF LEFT FEMALE BREAST, UNSPECIFIED ESTROGEN RECEPTOR STATUS, UNSPECIFIED SITE OF BREAST (HCC): Primary | ICD-10-CM

## 2019-04-15 NOTE — PROGRESS NOTES
Riley Flores is a 49 y.o. female here for follow up of breast cancer. 1. Have you been to the ER, urgent care clinic since your last visit?: No.  Hospitalized since your last visit?: No.    2. Have you seen or consulted any other health care providers outside of the [address] [place] [address] since your last visit? Include any pap smears or colon screening: Per patient, she does not think Dr. Juan R Porras is within [address] [place] [address].

## 2019-04-15 NOTE — PROGRESS NOTES
[address] [facility]   [address] [facility], [address] [place] [address]  Luiz, [place] 20  W: [phone]  F: [phone]     f/u HEME/ONC CONSULT    Reason for visit: evaluation for treatment for  Breast Cancer    Consulting physicians:  Dr. Callie Carrillo; Dr. Clive Cai    HPI:   Nirmala Blood is a 49 y.o.  female who is transferring her care for management of breast cancer. 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 3/9/16 left lumpectomy showed 1.3 cm of IDC, ER + at 82%, OR negative, HER 2 positive (IHC 3+; ratio 5; sig/cell 7.5),  0/2 LN involved, ki67 68%, gr 3, multiple satelite nodules up to 4 mm. [id number]. Was treated at [address] [place] [address] with Dr. Malik Jenkins. Received TH as in APT x 12 weeks (took 14 weeks due to neutropenia). Taxol was ended up DR by 30%. 4/22/16 this started, ended 7/22/16. Did have stomatitis with the outback herceptin. Started 8/19/16, completed 6/20/17    S/p XRT 10/7/16     Did have a bone density study, normal per pt -- done by Dr. Julio Mabry. Anastrozole: 1/2017-3/17/17, stopped due to sinus issues    Interval history: In today for follow up. Complains of gr 1 hot flashes, gr 1 cognition, gr 1 anxiety 1-2/10 pain, gr 1 vaginal dryness. DX   Encounter Diagnosis   Name Primary?  Malignant neoplasm of left female breast, unspecified site of breast ([address] Yes      Past Medical History   Diagnosis Date    Abnormal Pap smear 1990s     cryotherapy, no abnormals since    Contact dermatitis and other eczema, due to unspecified cause     Depression     Endometriosis     Malignant neoplasm of left female breast ([address] 3/28/2016    Oligomenorrhea      currently not having menstrual cycles X 3 months     Past Surgical History   Procedure Laterality Date    Hx heent       scarring from sinusitis.     Hx gyn       cervical cryotherapy      Social History     Social History    Marital status:      Spouse name: N/A    Number of children: 1    Years of education: N/A     Occupational History     [employer]      Social History Main Topics    Smoking status: Former Smoker    Smokeless tobacco: None    Alcohol use 1.0 oz/week     2 Cans of beer per week    Drug use: None    Sexual activity: Not Currently     Partners: Male     Birth control/ protection: Condom     Other Topics Concern    None     Social History Narrative     History reviewed. No pertinent family history. Allergies   Allergen Reactions    Augmentin [Amoxicillin-Pot Clavulanate] Nausea and Vomiting     Projectile vomiting, sweats, profuse diarrhea    Gluten Other (comments)     insensitity    Other Medication Hives     H2 antagonists,     Pseudoephedrine Hcl Hives         Current Outpatient Medications:     diazePAM (VALIUM) 2 mg tablet, Take 2 mg by mouth as needed. , Disp: , Rfl: 3    ibuprofen (ADVIL) 200 mg tablet, Take 200 mg by mouth as needed. , Disp: , Rfl:     Review of Systems    A comprehensive review of systems was performed and all systems were negative except for HPI and for the symptom review form, reviewed and scanned in.    Objective:  Visit Vitals    /84    Pulse 83    Temp 97.3 °F (36.3 °C) (Temporal)    Resp 18    Ht 5' 5\" (1.651 m)    Wt 201 lb 9.6 oz (91.4 kg)    SpO2 99%    BMI 33.55 kg/m2         General:  Alert, cooperative, no distress, appears stated age. Head:  Normocephalic, without obvious abnormality, atraumatic. Eyes:  Conjunctivae/corneas clear. PERRL, EOMs intact. Throat: Lips, mucosa, and tongue normal.    Neck: Supple, symmetrical, trachea midline, no adenopathy, thyroid: no enlargement/tenderness/nodules   Back:   Symmetric, no curvature. ROM normal. No CVA tenderness. Lungs:   Clear to auscultation bilaterally. Chest wall:  No tenderness or deformity. Heart:  Regular rate and rhythm, S1, S2 normal, no murmur, click, rub or gallop. Abdomen:   Soft, non-tender.  Bowel sounds normal. No masses,  No organomegaly. Extremities: Extremities normal, atraumatic, no cyanosis or edema. Skin: Skin color, texture, turgor normal. No rashes or lesions. Diagnostic Imaging   No results found for this or any previous visit. Lab Results  Lab Results   Component Value Date/Time    WBC 6.5 10/03/2014 03:36 PM    HGB 13.6 10/03/2014 03:36 PM    HCT 41.2 10/03/2014 03:36 PM    PLATELET 331 26/94/1798 03:36 PM    MCV 91 10/03/2014 03:36 PM    ABS. NEUTROPHILS 3.7 10/03/2014 03:36 PM    Hemoglobin (POC) 14.6 07/17/2013 10:13 PM    Hematocrit (POC) 43 07/17/2013 10:13 PM     Lab Results   Component Value Date/Time    Sodium 142 11/14/2016 04:12 PM    Potassium 3.8 11/14/2016 04:12 PM    Chloride 105 11/14/2016 04:12 PM    CO2 32 11/14/2016 04:12 PM    Glucose 111 (H) 11/14/2016 04:12 PM    BUN 10 11/14/2016 04:12 PM    Creatinine 0.77 11/14/2016 04:12 PM    GFR est AA >60 11/14/2016 04:12 PM    GFR est non-AA >60 11/14/2016 04:12 PM    Calcium 8.5 11/14/2016 04:12 PM    Sodium (POC) 140 07/17/2013 10:13 PM    Potassium (POC) 3.3 (L) 07/17/2013 10:13 PM    Chloride (POC) 101 07/17/2013 10:13 PM    Glucose (POC) 101 07/17/2013 10:13 PM    BUN (POC) 7 (L) 07/17/2013 10:13 PM    Creatinine (POC) 0.9 07/17/2013 10:13 PM    Calcium, ionized (POC) 1.11 (L) 07/17/2013 10:13 PM     No results found for: TBILI, ALT, SGOT, AP, TP, ALB, GLOB          Assessment/Plan:  49 y.o. female with L IDC, ER +, FL negative, HER 2 +, 1.3 cm, gr 3, 0/2 LN involved. PS 0    1. Left Breast cancer stage: IA    Hormonal therapy: administered    We explained to the patient that the goal of systemic adjuvant therapy is to improve the chances for cure and decrease the risk of relapse. We explained why a patient can have microscopic cancer spread now even though physical examination, laboratory studies and imaging studies are negative for cancer.  We explained that the same treatments used now as adjuvant or preventive treatments rarely if ever are curative in women who develop metastases. Using PREDICT!, without therapy, her 10 year OS is 79%; with therapy that increases to 89%, endocrine therapy is 5% of that. She feels that she is still cycling, but not having periods. FSH, LH, and estradiol reviewed from 6/1/15, appears postmenopausal, but estradiol not < 6, but close. Checked her [address], LH, estradiol in 12/2016 which confirmed that she is postmenopausal. Has not had a period in 3 years. Outback Herceptin completed on 6/20/17. 6 month TTE 10/2017, EF 60%. TTE on 2/2/17, EF 69%. TTE on 4/10/17, EF 63%. The risks and benefits of aromatase inhibitors (anastrozole, letrozole, and exemestane) were discussed in detail and the patient was informed of the following: Risks include the development of painful muscles and joints (arthralgia/myalgia) and bone loss. Muscle and joint pain can be severe but rarely result in any tissue damage; symptoms usually resolve in several weeks when the medication is stopped. Bone loss is common and a bone density test is recommended as a baseline and then yearly to every several years depending on initial results. The risk of fractures is increased by a few percent in patients taking these drugs, but careful monitoring of bone density and using bone protecting agents when indicated can minimize these risks. Unlike tamoxifen there is no increased risk of blood clots or endometrial cancer. AIs can cause or worsen vaginal dryness but women using these drugs should not use vaginal estrogen preparations for these symptoms. AIs can also cause or increase hot flashes. Any other symptoms should be reported. Again, after this discussion she has declined endocrine therapy. I informed her of the benefit that this provided in regards to breast cancer recurrence. She verbalizes understanding, but still declines at this time. Will continue to discuss this at future visits.       Not eligible for Neratinib due to < stage II. Alex mammogram in April and MRI in October with Dr. Eddi Garcia, with microcalcification, they decided to watch. She had alex mammogram in 12/2018 and MRI 2/2019 and were normal.  Will request results. 2. Emotional well being: She has excellent support and is coping well with her disease. 3. Pharyngitis/Sinusitis: much improved. ENT, Dr. Angelo Goetz following. She had a sinus CT which was rather unremarkable. 4. Cognitive issues: Has improved. Likely related to anxiety and prior therapy. Discussed referral to neuropsych-Dr. Delphine Diehl but she declines for now.      This patient was seen in conjunction with Keesha Kirk NP    > 15 min were spent with this patient with > 50% of that time spent in face to face counseling      Sweetie Malagon MD

## 2020-01-14 ENCOUNTER — OFFICE VISIT (OUTPATIENT)
Dept: ONCOLOGY | Age: 55
End: 2020-01-14

## 2020-01-14 VITALS
BODY MASS INDEX: 33.05 KG/M2 | SYSTOLIC BLOOD PRESSURE: 118 MMHG | TEMPERATURE: 96.7 F | HEIGHT: 65 IN | RESPIRATION RATE: 16 BRPM | OXYGEN SATURATION: 96 % | DIASTOLIC BLOOD PRESSURE: 69 MMHG | HEART RATE: 82 BPM

## 2020-01-14 DIAGNOSIS — C50.912 MALIGNANT NEOPLASM OF LEFT FEMALE BREAST, UNSPECIFIED ESTROGEN RECEPTOR STATUS, UNSPECIFIED SITE OF BREAST (HCC): Primary | ICD-10-CM

## 2020-01-14 DIAGNOSIS — R41.9 COGNITIVE COMPLAINTS: ICD-10-CM

## 2020-01-14 DIAGNOSIS — J32.9 CHRONIC SINUSITIS, UNSPECIFIED LOCATION: ICD-10-CM

## 2020-01-14 NOTE — PROGRESS NOTES
Yaima Smalls is a 49 y.o. female Follow up for the Evaluation for Breast Cancer. 1. Have you been to the ER, urgent care clinic since your last visit? Hospitalized since your last visit? No      2. Have you seen or consulted any other health care providers outside of the [address] [place] [address] since your last visit? Include any pap smears or colon screening.  No

## 2020-01-15 ENCOUNTER — OFFICE VISIT (OUTPATIENT)
Dept: ONCOLOGY | Age: 55
End: 2020-01-15

## 2020-01-15 VITALS
OXYGEN SATURATION: 100 % | BODY MASS INDEX: 33.05 KG/M2 | SYSTOLIC BLOOD PRESSURE: 143 MMHG | RESPIRATION RATE: 16 BRPM | HEART RATE: 79 BPM | DIASTOLIC BLOOD PRESSURE: 91 MMHG | TEMPERATURE: 97.1 F | HEIGHT: 65 IN

## 2020-01-15 DIAGNOSIS — J32.9 CHRONIC SINUSITIS, UNSPECIFIED LOCATION: ICD-10-CM

## 2020-01-15 DIAGNOSIS — R21 RASH: ICD-10-CM

## 2020-01-15 DIAGNOSIS — R41.9 COGNITIVE COMPLAINTS: ICD-10-CM

## 2020-01-15 DIAGNOSIS — C50.912 MALIGNANT NEOPLASM OF LEFT FEMALE BREAST, UNSPECIFIED ESTROGEN RECEPTOR STATUS, UNSPECIFIED SITE OF BREAST (HCC): Primary | ICD-10-CM

## 2020-01-15 RX ORDER — NYSTATIN 100000 [USP'U]/G
1 POWDER TOPICAL 3 TIMES DAILY
Qty: 30 G | Refills: 1 | Status: SHIPPED | OUTPATIENT
Start: 2020-01-15 | End: 2021-03-16

## 2020-01-15 NOTE — PROGRESS NOTES
Alex Palm is a 49 y.o. female Follow up for the Evaluation for Breast Cancer. 1. Have you been to the ER, urgent care clinic since your last visit? Hospitalized since your last visit? No    2. Have you seen or consulted any other health care providers outside of the [address] [place] [address] since your last visit? Include any pap smears or colon screening.  No

## 2020-01-15 NOTE — PROGRESS NOTES
[address], [address] [facility]  Luiz, [address]  W: [phone]  F: [phone]     f/u HEME/ONC CONSULT    Reason for visit: evaluation for treatment for  Breast Cancer    Consulting physicians:  Dr. Steven Herring; Dr. Cathleen Henson    HPI:   Debbi Kapoor is a 49 y.o.  female who is transferring her care for management of breast cancer. 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 3/9/16 left lumpectomy showed 1.3 cm of IDC, ER + at 82%, OR negative, HER 2 positive (IHC 3+; ratio 5; sig/cell 7.5),  0/2 LN involved, ki67 68%, gr 3, multiple satelite nodules up to 4 mm. [id number]. Was treated at [address] [place] [address] with Dr. Mickey Hawkins. Received TH as in APT x 12 weeks (took 14 weeks due to neutropenia). Taxol was ended up DR by 30%. 4/22/16 this started, ended 7/22/16. Did have stomatitis with the outback herceptin. Started 8/19/16, completed 6/20/17    S/p XRT 10/7/16     Did have a bone density study, normal per pt -- done by Dr. Mimi Ring. Anastrozole: 1/2017-3/17/17, stopped due to sinus issues    Interval history: In today for follow up. Complains of gr 1 constipation, gr 1 diarrhea, gr 1 fatigue, gr 1 hair loss, gr 1 hot flashes, gr 1 insomnia, gr 1 anxiety, gr 1 urinary leakage, gr 1 vaginal dryness. DX   Encounter Diagnosis   Name Primary?  Malignant neoplasm of left female breast, unspecified site of breast ([address] Yes      Past Medical History   Diagnosis Date    Abnormal Pap smear 1990s     cryotherapy, no abnormals since    Contact dermatitis and other eczema, due to unspecified cause     Depression     Endometriosis     Malignant neoplasm of left female breast ([address] 3/28/2016    Oligomenorrhea      currently not having menstrual cycles X 3 months     Past Surgical History   Procedure Laterality Date    Hx heent       scarring from sinusitis.     Hx gyn       cervical cryotherapy Social History     Social History    Marital status:      Spouse name: N/A    Number of children: 1    Years of education: N/A     Occupational History     [employer]      Social History Main Topics    Smoking status: Former Smoker    Smokeless tobacco: None    Alcohol use 1.0 oz/week     2 Cans of beer per week    Drug use: None    Sexual activity: Not Currently     Partners: Male     Birth control/ protection: Condom     Other Topics Concern    None     Social History Narrative     History reviewed. No pertinent family history. Allergies   Allergen Reactions    Augmentin [Amoxicillin-Pot Clavulanate] Nausea and Vomiting     Projectile vomiting, sweats, profuse diarrhea    Gluten Other (comments)     insensitity    Other Medication Hives     H2 antagonists,     Pseudoephedrine Hcl Hives         Current Outpatient Medications:     diazePAM (VALIUM) 2 mg tablet, Take 2 mg by mouth as needed. , Disp: , Rfl: 3    ibuprofen (ADVIL) 200 mg tablet, Take 200 mg by mouth as needed. , Disp: , Rfl:     Review of Systems    A comprehensive review of systems was performed and all systems were negative except for HPI and for the symptom review form, reviewed and scanned in.    Objective:      Vitals:    01/15/20 1531   BP: (!) 143/91   Pulse: 79   Resp: 16   Temp: 97.1 °F (36.2 °C)   TempSrc: Temporal   SpO2: 100%   Height: 5' 5\" (1.651 m)     General:  Alert, cooperative, no distress, appears stated age. Head:  Normocephalic, without obvious abnormality, atraumatic. Eyes:  Conjunctivae/corneas clear. PERRL, EOMs intact. Throat: Lips, mucosa, and tongue normal.    Neck: Supple, symmetrical, trachea midline, no adenopathy, thyroid: no enlargement/tenderness/nodules   Back:   Symmetric, no curvature. ROM normal. No CVA tenderness. Lungs:   Clear to auscultation bilaterally. Chest wall:  No tenderness or deformity.    Heart:  Regular rate and rhythm, S1, S2 normal, no murmur, click, rub or gallop. Abdomen:   Soft, non-tender. Bowel sounds normal. No masses,  No organomegaly. Extremities: Extremities normal, atraumatic, no cyanosis or edema. Skin: Fungal rash under L breast     Diagnostic Imaging   No results found for this or any previous visit. Lab Results  Lab Results   Component Value Date/Time    WBC 6.5 10/03/2014 03:36 PM    HGB 13.6 10/03/2014 03:36 PM    HCT 41.2 10/03/2014 03:36 PM    PLATELET 786 40/18/6908 03:36 PM    MCV 91 10/03/2014 03:36 PM    ABS. NEUTROPHILS 3.7 10/03/2014 03:36 PM    Hemoglobin (POC) 14.6 07/17/2013 10:13 PM    Hematocrit (POC) 43 07/17/2013 10:13 PM     Lab Results   Component Value Date/Time    Sodium 142 11/14/2016 04:12 PM    Potassium 3.8 11/14/2016 04:12 PM    Chloride 105 11/14/2016 04:12 PM    CO2 32 11/14/2016 04:12 PM    Glucose 111 (H) 11/14/2016 04:12 PM    BUN 10 11/14/2016 04:12 PM    Creatinine 0.77 11/14/2016 04:12 PM    GFR est AA >60 11/14/2016 04:12 PM    GFR est non-AA >60 11/14/2016 04:12 PM    Calcium 8.5 11/14/2016 04:12 PM    Sodium (POC) 140 07/17/2013 10:13 PM    Potassium (POC) 3.3 (L) 07/17/2013 10:13 PM    Chloride (POC) 101 07/17/2013 10:13 PM    Glucose (POC) 101 07/17/2013 10:13 PM    BUN (POC) 7 (L) 07/17/2013 10:13 PM    Creatinine (POC) 0.9 07/17/2013 10:13 PM    Calcium, ionized (POC) 1.11 (L) 07/17/2013 10:13 PM     No results found for: TBILI, ALT, SGOT, AP, TP, ALB, GLOB      Assessment/Plan:  49 y.o. female with L IDC, ER +, VT negative, HER 2 +, 1.3 cm, gr 3, 0/2 LN involved. PS 0    1. Left Breast cancer stage: IA    Hormonal therapy: administered    We explained to the patient that the goal of systemic adjuvant therapy is to improve the chances for cure and decrease the risk of relapse. We explained why a patient can have microscopic cancer spread now even though physical examination, laboratory studies and imaging studies are negative for cancer.  We explained that the same treatments used now as adjuvant or preventive treatments rarely if ever are curative in women who develop metastases. Using PREDICT!, without therapy, her 10 year OS is 79%; with therapy that increases to 89%, endocrine therapy is 5% of that. She feels that she is still cycling, but not having periods. FSH, LH, and estradiol reviewed from 6/1/15, appears postmenopausal, but estradiol not < 6, but close. Checked her [address], LH, estradiol in 12/2016 which confirmed that she is postmenopausal. Has not had a period in 3 years. Outback Herceptin completed on 6/20/17. 6 month TTE 10/2017, EF 60%. TTE on 2/2/17, EF 69%. TTE on 4/10/17, EF 63%. The risks and benefits of aromatase inhibitors (anastrozole, letrozole, and exemestane) were discussed in detail and the patient was informed of the following: Risks include the development of painful muscles and joints (arthralgia/myalgia) and bone loss. Muscle and joint pain can be severe but rarely result in any tissue damage; symptoms usually resolve in several weeks when the medication is stopped. Bone loss is common and a bone density test is recommended as a baseline and then yearly to every several years depending on initial results. The risk of fractures is increased by a few percent in patients taking these drugs, but careful monitoring of bone density and using bone protecting agents when indicated can minimize these risks. Unlike tamoxifen there is no increased risk of blood clots or endometrial cancer. AIs can cause or worsen vaginal dryness but women using these drugs should not use vaginal estrogen preparations for these symptoms. AIs can also cause or increase hot flashes. Any other symptoms should be reported. Again, after this discussion she has declined endocrine therapy. I informed her of the benefit that this provided in regards to breast cancer recurrence. She verbalizes understanding, but still declines at this time. Not eligible for Neratinib due to < stage II.       Eliel mammogram in April and MRI in October with Dr. Felipa Knight, with microcalcification, they decided to watch. She had alex mammogram in 12/2018 and MRI 2/2019 and were normal.  Will request results. Next mammo for 3/2020. Also being followed by Dr. Austin Robins. 2. Emotional well being: She has excellent support and is coping well with her disease. 3. Pharyngitis/Sinusitis: much improved. ENT, Dr. Wilian Wallace following. She had a sinus CT which was rather unremarkable. 4. Cognitive issues: Continues to improve. Likely related to anxiety and prior therapy. Discussed referral to neuropsych-Dr. Poonam Sosa but she declines for now. 5. Rash: Very mild under left breast.  Fungal; she has nystatin cream at home and will use it    This patient was seen in conjunction with Peterson Waters NP.  I personally reviewed the history and all points in the assessment and plan with the patient, and performed key points on the exam.       Summer Pelaez MD

## 2020-10-15 ENCOUNTER — TELEPHONE (OUTPATIENT)
Dept: ONCOLOGY | Age: 55
End: 2020-10-15

## 2020-10-15 NOTE — TELEPHONE ENCOUNTER
LVM for patient about a virtual appointment but need to be scheduled from 8:15am though 8:30am then 2:45pm though 4pm every 15 minutes but do not double book

## 2020-10-18 NOTE — PROGRESS NOTES
Cancer [place] at [facility]  [address] [facility], [place], [address] [facility] [address] [facility]  W: [phone]  F: [phone]    HEME/ONC FOLLOW UP      Reason for Visit:   Lemuel Roca is a 48 y.o. female who is seen by synchronous (real-time) audio-video technology for follow up of breast cancer    Consulting physicians:  Dr. Javad Larry; Dr. Calixto Aranda    HPI:   Lemuel Roca is a 48 y.o. female who is seen in follow up for management of breast cancer. 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 3/9/16 left lumpectomy showed 1.3 cm of IDC, ER + at 82%, [place] negative, HER 2 positive (IHC 3+; ratio 5; sig/cell 7.5),  0/2 LN involved, ki67 68%, gr 3, multiple satelite nodules up to 4 mm. [id number]. Was treated at [address] [place] [address] with Dr. Dimple Lovett. Received TH as in APT x 12 weeks (took 14 weeks due to neutropenia). Taxol was ended up DR by 30%. 4/22/16 this started, ended 7/22/16. Did have stomatitis with the outback herceptin. Started 8/19/16, completed 6/20/17    S/p XRT 10/7/16     Did have a bone density study, normal per pt -- done by Dr. Ca Thompson. Anastrozole: 1/2017-3/17/17, stopped due to sinus issues    Interval history: is experiencing abdominal issues, had an abd US on 10/30/20 showing only hepatic steatosis. Was having yellow, watery diarrhea    DX   Encounter Diagnosis   Name Primary?  Malignant neoplasm of left female breast, unspecified site of breast ([address] Yes      Past Medical History   Diagnosis Date    Abnormal Pap smear 1990s     cryotherapy, no abnormals since    Contact dermatitis and other eczema, due to unspecified cause     Depression     Endometriosis     Malignant neoplasm of left female breast ([address] 3/28/2016    Oligomenorrhea      currently not having menstrual cycles X 3 months     Past Surgical History   Procedure Laterality Date    Hx heent       scarring from sinusitis.     Hx gyn cervical cryotherapy      Social History     Social History    Marital status:      Spouse name: N/A    Number of children: 1    Years of education: N/A     Occupational History     [employer]      Social History Main Topics    Smoking status: Former Smoker    Smokeless tobacco: None    Alcohol use 1.0 oz/week     2 Cans of beer per week    Drug use: None    Sexual activity: Not Currently     Partners: Male     Birth control/ protection: Condom     Other Topics Concern    None     Social History Narrative     History reviewed. No pertinent family history. Allergies   Allergen Reactions    Augmentin [Amoxicillin-Pot Clavulanate] Nausea and Vomiting     Projectile vomiting, sweats, profuse diarrhea    Gluten Other (comments)     insensitity    Other Medication Hives     H2 antagonists,     Pseudoephedrine Hcl Hives         Current Outpatient Medications:     nystatin (MYCOSTATIN) powder, Apply 1 Units to affected area three (3) times daily. , Disp: 30 g, Rfl: 1    diazePAM (VALIUM) 2 mg tablet, Take 2 mg by mouth as needed. , Disp: , Rfl: 3    ibuprofen (ADVIL) 200 mg tablet, Take 200 mg by mouth as needed. , Disp: , Rfl:     Review of Systems    A comprehensive review of systems was performed and all systems were negative except for HPI    Objective: There were no vitals filed for this visit.     Constitutional: Appears well-developed and well-nourished in no apparent distress      Mental status: Alert and awake, Oriented to person/place/time, Able to follow commands    Eyes: EOM normal, Sclera normal, No visible ocular discharge    HENT: Normocephalic, atraumatic    Mouth/Throat: Moist mucous membranes    External Ears normal    Neck: No visualized mass    Pulmonary/Chest: Respiratory effort normal, No visualized signs of difficulty breathing or respiratory distress    Musculoskeletal: Normal gait with no signs of ataxia, Normal range of motion of neck    Neurological: No facial asymmetry (Cranial nerve 7 motor function), No gaze palsy    Skin: No significant exanthematous lesions or discoloration noted on facial skin    Psychiatric: Normal affect, No hallucinations     Due to this being a TeleHealth evaluation, many elements of the physical examination are unable to be assessed. Diagnostic Imaging   No results found for this or any previous visit. Lab Results  Lab Results   Component Value Date/Time    WBC 6.5 10/03/2014 03:36 PM    HGB 13.6 10/03/2014 03:36 PM    HCT 41.2 10/03/2014 03:36 PM    PLATELET 041 34/38/3853 03:36 PM    MCV 91 10/03/2014 03:36 PM    ABS. NEUTROPHILS 3.7 10/03/2014 03:36 PM    Hemoglobin (POC) 14.6 07/17/2013 10:13 PM    Hematocrit (POC) 43 07/17/2013 10:13 PM     Lab Results   Component Value Date/Time    Sodium 142 11/14/2016 04:12 PM    Potassium 3.8 11/14/2016 04:12 PM    Chloride 105 11/14/2016 04:12 PM    CO2 32 11/14/2016 04:12 PM    Glucose 111 (H) 11/14/2016 04:12 PM    BUN 10 11/14/2016 04:12 PM    Creatinine 0.77 11/14/2016 04:12 PM    GFR est AA >60 11/14/2016 04:12 PM    GFR est non-AA >60 11/14/2016 04:12 PM    Calcium 8.5 11/14/2016 04:12 PM    Sodium (POC) 140 07/17/2013 10:13 PM    Potassium (POC) 3.3 (L) 07/17/2013 10:13 PM    Chloride (POC) 101 07/17/2013 10:13 PM    Glucose (POC) 101 07/17/2013 10:13 PM    BUN (POC) 7 (L) 07/17/2013 10:13 PM    Creatinine (POC) 0.9 07/17/2013 10:13 PM    Calcium, ionized (POC) 1.11 (L) 07/17/2013 10:13 PM     No results found for: TBILI, ALT, AP, TP, ALB, GLOB      Assessment/Plan:  48 y.o. female with L IDC, ER +, MI negative, HER 2 +, 1.3 cm, gr 3, 0/2 LN involved. PS 0    1. Left Breast cancer stage: IA    Hormonal therapy: administered    Using PREDICT!, without therapy, her 10 year OS is 79%; with therapy that increases to 89%, endocrine therapy is 5% of that.     FSH, LH, and estradiol reviewed from 6/1/15, appears postmenopausal, but estradiol not < 6, but close. Checked her [address], LH, estradiol in 12/2016 which confirmed that she is postmenopausal since this time. Outback Herceptin completed on 6/20/17. 6 month TTE 10/2017, EF 60%. TTE on 2/2/17, EF 69%. TTE on 4/10/17, EF 63%. After multiple discussions, she has declined endocrine therapy. I informed her of the benefit that this provided in regards to breast cancer recurrence. She verbalized understanding,. Not eligible for Neratinib due to < stage II.      mammo 3/2020, negative per pt, will get records. Being followed by Dr. Shawn Campos. Did not get the breast MRI this year. 2. Emotional well being: She has excellent support and is coping well with her disease. 3. Diarrhea/abd pain:  New; Discussed if continues would recommend she discuss with PCP about a pancreatic protocol CT as the pancreas was not seen on the US    Follow up 9 months     I was in the office while conducting this encounter. The patient was at her home. Consent:  She and/or her healthcare decision maker is aware that this patient-initiated Telehealth encounter is a billable service, with coverage as determined by her insurance carrier. She is aware that she may receive a bill and has provided verbal consent to proceed: Yes    Pursuant to the emergency declaration under the [address] and the [facility] waiver authority and the [employer] General Act, this Virtual  Visit was conducted, with patient's (and/or legal guardian's) consent, to reduce the patient's risk of exposure to COVID-19 and provide necessary medical care. Services were provided through a video synchronous discussion virtually to substitute for in-person visit.       Sandra Mendez MD

## 2020-10-19 ENCOUNTER — TELEPHONE (OUTPATIENT)
Dept: ONCOLOGY | Age: 55
End: 2020-10-19

## 2020-10-19 NOTE — TELEPHONE ENCOUNTER
LVM for patient to let her know that yes we can do a virtual appointment but we want to do it on 10/20/2020 at 8:15am

## 2020-10-20 ENCOUNTER — TELEPHONE (OUTPATIENT)
Dept: ONCOLOGY | Age: 55
End: 2020-10-20

## 2020-10-28 ENCOUNTER — TRANSCRIBE ORDER (OUTPATIENT)
Dept: SCHEDULING | Age: 55
End: 2020-10-28

## 2020-10-28 DIAGNOSIS — R11.0 NAUSEA: ICD-10-CM

## 2020-10-28 DIAGNOSIS — R10.13 EPIGASTRIC PAIN: Primary | ICD-10-CM

## 2020-10-28 DIAGNOSIS — R10.11 RIGHT UPPER QUADRANT PAIN: ICD-10-CM

## 2020-10-28 DIAGNOSIS — K91.5 POSTCHOLECYSTECTOMY SYNDROME: ICD-10-CM

## 2020-10-30 ENCOUNTER — HOSPITAL ENCOUNTER (OUTPATIENT)
Dept: ULTRASOUND IMAGING | Age: 55
Discharge: HOME | End: 2020-10-30
Attending: NURSE PRACTITIONER
Payer: COMMERCIAL

## 2020-10-30 DIAGNOSIS — K91.5 POSTCHOLECYSTECTOMY SYNDROME: ICD-10-CM

## 2020-10-30 DIAGNOSIS — R10.13 EPIGASTRIC PAIN: ICD-10-CM

## 2020-10-30 DIAGNOSIS — R11.0 NAUSEA: ICD-10-CM

## 2020-10-30 DIAGNOSIS — R10.11 RIGHT UPPER QUADRANT PAIN: ICD-10-CM

## 2020-10-30 PROCEDURE — 76700 US EXAM ABDOM COMPLETE: CPT

## 2020-11-03 ENCOUNTER — VIRTUAL VISIT (OUTPATIENT)
Dept: ONCOLOGY | Age: 55
End: 2020-11-03
Payer: COMMERCIAL

## 2020-11-03 DIAGNOSIS — C50.912 MALIGNANT NEOPLASM OF LEFT FEMALE BREAST, UNSPECIFIED ESTROGEN RECEPTOR STATUS, UNSPECIFIED SITE OF BREAST (HCC): Primary | ICD-10-CM

## 2020-11-03 PROCEDURE — 99214 OFFICE O/P EST MOD 30 MIN: CPT | Performed by: INTERNAL MEDICINE

## 2021-03-16 DIAGNOSIS — R21 RASH: ICD-10-CM

## 2021-03-16 DIAGNOSIS — C50.912 MALIGNANT NEOPLASM OF LEFT FEMALE BREAST, UNSPECIFIED ESTROGEN RECEPTOR STATUS, UNSPECIFIED SITE OF BREAST (HCC): ICD-10-CM

## 2021-03-16 RX ORDER — NYSTATIN 100000 [USP'U]/G
1 POWDER TOPICAL 3 TIMES DAILY
Qty: 60 G | Refills: 3 | Status: SHIPPED | OUTPATIENT
Start: 2021-03-16 | End: 2021-09-13

## 2021-09-10 NOTE — PROGRESS NOTES
Cancer [place] at [address], [address] [facility] 100[address] [facility]  W: [phone]  F: [phone]    HEME/ONC FOLLOW UP    Reason for Visit:   Alvin Kirby is a 55 y.o. female who is seen for follow up of breast cancer    Consulting physicians:  Dr. Isidro Gonzalez; Dr. Ciera Jernigan    HPI:   Alvin Kirby is a 55 y.o. female who is seen in follow up for management of breast cancer. 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 3/9/16 left lumpectomy showed 1.3 cm of IDC, ER + at 82%, [place] negative, HER 2 positive (IHC 3+; ratio 5; sig/cell 7.5),  0/2 LN involved, ki67 68%, gr 3, multiple satelite nodules up to 4 mm. [id number]. Was treated at [address] [place] [address] with Dr. Kike Schroeder. Received TH as in APT x 12 weeks (took 14 weeks due to neutropenia). Taxol was ended up DR by 30%. 4/22/16 this started, ended 7/22/16. Did have stomatitis with the outback herceptin. Started 8/19/16, completed 6/20/17    S/p XRT 10/7/16     Did have a bone density study, normal per pt -- done by Dr. Reed Trejo. Anastrozole: 1/2017-3/17/17, stopped due to sinus issues    Interval history: Reports grade 1 constipation, grade 1 diarrhea, grade 1 chills. Denies pain. Mild itching. Mild headache. DX   Encounter Diagnosis   Name Primary?  Malignant neoplasm of left female breast, unspecified site of breast ([address] Yes      Past Medical History   Diagnosis Date    Abnormal Pap smear 1990s     cryotherapy, no abnormals since    Contact dermatitis and other eczema, due to unspecified cause     Depression     Endometriosis     Malignant neoplasm of left female breast ([address] 3/28/2016    Oligomenorrhea      currently not having menstrual cycles X 3 months     Past Surgical History   Procedure Laterality Date    Hx heent       scarring from sinusitis.     Hx gyn       cervical cryotherapy      Social History     Social History    Marital status:      Spouse name: N/A    Number of children: 1    Years of education: N/A     Occupational History     [employer]      Social History Main Topics    Smoking status: Former Smoker    Smokeless tobacco: None    Alcohol use 1.0 oz/week     2 Cans of beer per week    Drug use: None    Sexual activity: Not Currently     Partners: Male     Birth control/ protection: Condom     Other Topics Concern    None     Social History Narrative     History reviewed. No pertinent family history. Allergies   Allergen Reactions    Augmentin [Amoxicillin-Pot Clavulanate] Nausea and Vomiting     Projectile vomiting, sweats, profuse diarrhea    Gluten Other (comments)     insensitity    Other Medication Hives     H2 antagonists,     Pseudoephedrine Hcl Hives         Current Outpatient Medications:     diazePAM (VALIUM) 2 mg tablet, Take 2 mg by mouth as needed. , Disp: , Rfl: 3    ibuprofen (ADVIL) 200 mg tablet, Take 200 mg by mouth as needed. , Disp: , Rfl:     nystatin (MYCOSTATIN) powder, APPLY 1 UNITS TO AFFECTED AREA THREE (3) TIMES DAILY. (Patient not taking: Reported on 9/13/2021), Disp: 60 g, Rfl: 3    Review of Systems    A comprehensive review of systems was performed and all systems were negative except for HPI    Objective:    Vitals:    09/13/21 1042   BP: (!) 146/85   Pulse: 74   Resp: 18   Temp: 98 °F (36.7 °C)   TempSrc: Temporal   SpO2: 100%   Height: 5' 5\" (1.651 m)   PainSc:   0 - No pain     General: no distress  Eyes: anicteric sclerae  HENT: oropharynx clear  Neck: supple  Lymphatic: no cervical, supraclavicular, or inguinal adenopathy  Respiratory: normal respiratory effort  CV: no peripheral edema  GI: soft, nontender, nondistended, no masses  Skin: no rashes; no ecchymoses; no petechiae    Diagnostic Imaging   No results found for this or any previous visit.     Lab Results  Lab Results   Component Value Date/Time    WBC 6.5 10/03/2014 03:36 PM    HGB 13.6 10/03/2014 03:36 PM    HCT 41.2 10/03/2014 03:36 PM    PLATELET 849 20/52/2966 03:36 PM    MCV 91 10/03/2014 03:36 PM    ABS. NEUTROPHILS 3.7 10/03/2014 03:36 PM    Hemoglobin (POC) 14.6 07/17/2013 10:13 PM    Hematocrit (POC) 43 07/17/2013 10:13 PM     Lab Results   Component Value Date/Time    Sodium 142 11/14/2016 04:12 PM    Potassium 3.8 11/14/2016 04:12 PM    Chloride 105 11/14/2016 04:12 PM    CO2 32 11/14/2016 04:12 PM    Glucose 111 (H) 11/14/2016 04:12 PM    BUN 10 11/14/2016 04:12 PM    Creatinine 0.77 11/14/2016 04:12 PM    GFR est AA >60 11/14/2016 04:12 PM    GFR est non-AA >60 11/14/2016 04:12 PM    Calcium 8.5 11/14/2016 04:12 PM    Sodium (POC) 140 07/17/2013 10:13 PM    Potassium (POC) 3.3 (L) 07/17/2013 10:13 PM    Chloride (POC) 101 07/17/2013 10:13 PM    Glucose (POC) 101 07/17/2013 10:13 PM    BUN (POC) 7 (L) 07/17/2013 10:13 PM    Creatinine (POC) 0.9 07/17/2013 10:13 PM    Calcium, ionized (POC) 1.11 (L) 07/17/2013 10:13 PM     No results found for: TBILI, ALT, AP, TP, ALB, GLOB      Assessment/Plan:  55 y.o. female with L IDC, ER +, MA negative, HER 2 +, 1.3 cm, gr 3, 0/2 LN involved. PS 0    1. Left Breast cancer stage: IA    Hormonal therapy: administered    Using PREDICT!, without therapy, her 10 year OS is 79%; with therapy that increases to 89%, endocrine therapy is 5% of that. FSH, LH, and estradiol reviewed from 6/1/15, appears postmenopausal, but estradiol not < 6, but close. Checked her [address], LH, estradiol in 12/2016 which confirmed that she is postmenopausal since this time. Outback Herceptin completed on 6/20/17. 6 month TTE 10/2017, EF 60%. TTE on 2/2/17, EF 69%. TTE on 4/10/17, EF 63%. After multiple discussions, she has declined endocrine therapy. I informed her of the benefit that this provided in regards to breast cancer recurrence. She verbalized understanding,. Not eligible for Neratinib due to < stage II.       Mammogram with Dr. Emmanuel Negron on 7/2021 normal, repeat annually. Will see Dr. Adira Kenney annually. 2. Emotional well being: She has excellent support and is coping well with her disease. She received COVID vaccination in [place] [address].     3. Diarrhea/constipation alternating:  Had gallbladder removed. Following with GI. US abdomen 10/2020. Had colonoscopy recently (2019). This patient was seen in conjunction with Odell Aguirre NP. I personally reviewed the history and all points in the assessment and plan, and performed key points on the exam. L breast cancer, ER +/HER 2 +, 5 years out from therapy.   Ok to return prn, she will f/u with Dr. Sarah Gregg for yearly mammogram      Sharon Graham MD

## 2021-09-13 ENCOUNTER — OFFICE VISIT (OUTPATIENT)
Dept: ONCOLOGY | Age: 56
End: 2021-09-13
Payer: COMMERCIAL

## 2021-09-13 VITALS
OXYGEN SATURATION: 100 % | HEART RATE: 74 BPM | SYSTOLIC BLOOD PRESSURE: 146 MMHG | HEIGHT: 65 IN | RESPIRATION RATE: 18 BRPM | TEMPERATURE: 98 F | DIASTOLIC BLOOD PRESSURE: 85 MMHG | BODY MASS INDEX: 33.05 KG/M2

## 2021-09-13 DIAGNOSIS — C50.912 MALIGNANT NEOPLASM OF LEFT FEMALE BREAST, UNSPECIFIED ESTROGEN RECEPTOR STATUS, UNSPECIFIED SITE OF BREAST (HCC): Primary | ICD-10-CM

## 2021-09-13 PROCEDURE — 99212 OFFICE O/P EST SF 10 MIN: CPT | Performed by: INTERNAL MEDICINE

## 2022-12-10 ENCOUNTER — HOSPITAL ENCOUNTER (EMERGENCY)
Age: 57
Discharge: HOME | End: 2022-12-10
Attending: STUDENT IN AN ORGANIZED HEALTH CARE EDUCATION/TRAINING PROGRAM
Payer: COMMERCIAL

## 2022-12-10 VITALS
BODY MASS INDEX: 33.32 KG/M2 | SYSTOLIC BLOOD PRESSURE: 158 MMHG | OXYGEN SATURATION: 100 % | WEIGHT: 200 LBS | HEIGHT: 65 IN | HEART RATE: 96 BPM | DIASTOLIC BLOOD PRESSURE: 94 MMHG | TEMPERATURE: 98.2 F | RESPIRATION RATE: 18 BRPM

## 2022-12-10 DIAGNOSIS — R10.11 RUQ ABDOMINAL PAIN: Primary | ICD-10-CM

## 2022-12-10 DIAGNOSIS — M25.512 ACUTE PAIN OF LEFT SHOULDER: ICD-10-CM

## 2022-12-10 LAB
ALBUMIN SERPL-MCNC: 4 G/DL (ref 3.5–5)
ALBUMIN/GLOB SERPL: 1 {RATIO} (ref 1.1–2.2)
ALP SERPL-CCNC: 105 U/L (ref 45–117)
ALT SERPL-CCNC: 32 U/L (ref 12–78)
ANION GAP SERPL CALC-SCNC: 6 MMOL/L (ref 5–15)
APPEARANCE UR: CLEAR
AST SERPL-CCNC: 26 U/L (ref 15–37)
BACTERIA URNS QL MICRO: ABNORMAL /HPF
BASOPHILS # BLD: 0.1 K/UL (ref 0–0.1)
BASOPHILS NFR BLD: 1 % (ref 0–1)
BILIRUB SERPL-MCNC: 0.6 MG/DL (ref 0.2–1)
BILIRUB UR QL: NEGATIVE
BUN SERPL-MCNC: 14 MG/DL (ref 6–20)
BUN/CREAT SERPL: 14 (ref 12–20)
CALCIUM SERPL-MCNC: 9.4 MG/DL (ref 8.5–10.1)
CHLORIDE SERPL-SCNC: 104 MMOL/L (ref 97–108)
CO2 SERPL-SCNC: 29 MMOL/L (ref 21–32)
COLOR UR: ABNORMAL
COMMENT, HOLDF: NORMAL
COMMENT, HOLDF: NORMAL
CREAT SERPL-MCNC: 0.99 MG/DL (ref 0.55–1.02)
DIFFERENTIAL METHOD BLD: NORMAL
EOSINOPHIL # BLD: 0.3 K/UL (ref 0–0.4)
EOSINOPHIL NFR BLD: 4 % (ref 0–7)
EPITH CASTS URNS QL MICRO: ABNORMAL /LPF
ERYTHROCYTE [DISTWIDTH] IN BLOOD BY AUTOMATED COUNT: 13.2 % (ref 11.5–14.5)
GLOBULIN SER CALC-MCNC: 3.9 G/DL (ref 2–4)
GLUCOSE SERPL-MCNC: 102 MG/DL (ref 65–100)
GLUCOSE UR STRIP.AUTO-MCNC: NEGATIVE MG/DL
HCT VFR BLD AUTO: 42.6 % (ref 35–47)
HGB BLD-MCNC: 13.9 G/DL (ref 11.5–16)
HGB UR QL STRIP: NEGATIVE
IMM GRANULOCYTES # BLD AUTO: 0 K/UL (ref 0–0.04)
IMM GRANULOCYTES NFR BLD AUTO: 0 % (ref 0–0.5)
KETONES UR QL STRIP.AUTO: NEGATIVE MG/DL
LEUKOCYTE ESTERASE UR QL STRIP.AUTO: NEGATIVE
LIPASE SERPL-CCNC: 219 U/L (ref 73–393)
LYMPHOCYTES # BLD: 1.8 K/UL (ref 0.8–3.5)
LYMPHOCYTES NFR BLD: 24 % (ref 12–49)
MCH RBC QN AUTO: 28.6 PG (ref 26–34)
MCHC RBC AUTO-ENTMCNC: 32.6 G/DL (ref 30–36.5)
MCV RBC AUTO: 87.7 FL (ref 80–99)
MONOCYTES # BLD: 0.5 K/UL (ref 0–1)
MONOCYTES NFR BLD: 7 % (ref 5–13)
NEUTS SEG # BLD: 4.7 K/UL (ref 1.8–8)
NEUTS SEG NFR BLD: 64 % (ref 32–75)
NITRITE UR QL STRIP.AUTO: NEGATIVE
NRBC # BLD: 0 K/UL (ref 0–0.01)
NRBC BLD-RTO: 0 PER 100 WBC
PH UR STRIP: 6 [PH] (ref 5–8)
PLATELET # BLD AUTO: 326 K/UL (ref 150–400)
PMV BLD AUTO: 10.5 FL (ref 8.9–12.9)
POTASSIUM SERPL-SCNC: 3.3 MMOL/L (ref 3.5–5.1)
PROT SERPL-MCNC: 7.9 G/DL (ref 6.4–8.2)
PROT UR STRIP-MCNC: NEGATIVE MG/DL
RBC # BLD AUTO: 4.86 M/UL (ref 3.8–5.2)
RBC #/AREA URNS HPF: ABNORMAL /HPF (ref 0–5)
SAMPLES BEING HELD,HOLD: NORMAL
SAMPLES BEING HELD,HOLD: NORMAL
SODIUM SERPL-SCNC: 139 MMOL/L (ref 136–145)
SP GR UR REFRACTOMETRY: <1.005 (ref 1–1.03)
TROPONIN-HIGH SENSITIVITY: 5 NG/L (ref 0–51)
UR CULT HOLD, URHOLD: NORMAL
UROBILINOGEN UR QL STRIP.AUTO: 0.2 EU/DL (ref 0.2–1)
WBC # BLD AUTO: 7.5 K/UL (ref 3.6–11)
WBC URNS QL MICRO: ABNORMAL /HPF (ref 0–4)

## 2022-12-10 PROCEDURE — 85025 COMPLETE CBC W/AUTO DIFF WBC: CPT

## 2022-12-10 PROCEDURE — 81001 URINALYSIS AUTO W/SCOPE: CPT

## 2022-12-10 PROCEDURE — 80053 COMPREHEN METABOLIC PANEL: CPT

## 2022-12-10 PROCEDURE — 83690 ASSAY OF LIPASE: CPT

## 2022-12-10 PROCEDURE — 99283 EMERGENCY DEPT VISIT LOW MDM: CPT

## 2022-12-10 PROCEDURE — 36415 COLL VENOUS BLD VENIPUNCTURE: CPT

## 2022-12-10 PROCEDURE — 93005 ELECTROCARDIOGRAM TRACING: CPT

## 2022-12-10 PROCEDURE — 84484 ASSAY OF TROPONIN QUANT: CPT

## 2022-12-10 NOTE — ED PROVIDER NOTES
The patient is a 60-year-old female presenting today with abdominal discomfort. Patient reports that she has had her gallbladder out in the past.  For the past several years she has had intermittent issues with right upper quadrant squeezing sensation and pain. States that it feels like there is a baseball in her right upper quadrant with squeezing. Has had endoscopy, colonoscopy and GI evaluations and told to take omeprazole. Patient reports that several hours ago she had a \"wave\". States that she had that sensation in the right upper quadrant followed by left shoulder pain, nausea, indigestion. She has had on and off indigestion for a while now. He denies pain up into the chest or shortness of breath. Reports that she has also noticed higher volumes of urine and she has to urinate with \"foam\" in the urine. She has not had fever or other complaints. Right now she has no pain or symptoms. Past Medical History:   Diagnosis Date    Abnormal Pap smear 1990s    cryotherapy, no abnormals since    Contact dermatitis and other eczema, due to unspecified cause     Depression     Endometriosis     Malignant neoplasm of left female breast ([facility] 3/28/2016    Oligomenorrhea     currently not having menstrual cycles X 3 months       Past Surgical History:   Procedure Laterality Date    HX CHOLECYSTECTOMY      HX GYN      cervical cryotherapy     HX HEENT      scarring from sinusitis. No family history on file. Social History     Socioeconomic History    Marital status:      Spouse name: Not on file    Number of children: 1    Years of education: Not on file    Highest education level: Not on file   Occupational History     Employer: [employer]    Tobacco Use    Smoking status: Former    Smokeless tobacco: Never   Substance and Sexual Activity    Alcohol use:  Yes     Alcohol/week: 1.7 standard drinks     Types: 2 Cans of beer per week    Drug use: Not on file    Sexual activity: Not Currently     Partners: Male     Birth control/protection: Condom   Other Topics Concern    Not on file   Social History Narrative    Not on file     Social Determinants of Health     Financial Resource Strain: Not on file   Food Insecurity: Not on file   Transportation Needs: Not on file   Physical Activity: Not on file   Stress: Not on file   Social Connections: Not on file   Intimate Partner Violence: Not on file   Housing Stability: Not on file         ALLERGIES: Augmentin [amoxicillin-pot clavulanate], Gluten, Other medication, and Pseudoephedrine hcl    Review of Systems   Constitutional:  Negative for chills and fever. HENT:  Negative for congestion and rhinorrhea. Eyes:  Negative for redness and visual disturbance. Respiratory:  Negative for cough and shortness of breath. Cardiovascular:  Negative for chest pain and leg swelling. Gastrointestinal:  Positive for abdominal pain and nausea. Negative for diarrhea and vomiting. Genitourinary:  Negative for dysuria, flank pain, frequency, hematuria and urgency. Musculoskeletal:  Positive for arthralgias. Negative for back pain, myalgias and neck pain. Skin:  Negative for rash and wound. Allergic/Immunologic: Negative for immunocompromised state. Neurological:  Negative for dizziness and headaches. Vitals:    12/10/22 1609   BP: (!) 158/94   Pulse: 96   Resp: 18   Temp: 98.2 °F (36.8 °C)   SpO2: 100%   Weight: 90.7 kg (200 lb)   Height: 5' 5\" (1.651 m)            Physical Exam  Vitals and nursing note reviewed. Constitutional:       General: She is not in acute distress. Appearance: She is well-developed. She is not diaphoretic. HENT:      Head: Normocephalic. Mouth/Throat:      Pharynx: No oropharyngeal exudate. Eyes:      General:         Right eye: No discharge. Left eye: No discharge. Pupils: Pupils are equal, round, and reactive to light.    Cardiovascular:      Rate and Rhythm: Normal rate and regular rhythm. Heart sounds: Normal heart sounds. No murmur heard. No friction rub. No gallop. Comments: Equal radial and DP/PT pulses  Pulmonary:      Effort: Pulmonary effort is normal. No respiratory distress. Breath sounds: Normal breath sounds. No stridor. No wheezing or rales. Abdominal:      General: Bowel sounds are normal. There is no distension. Palpations: Abdomen is soft. Tenderness: There is no abdominal tenderness. There is no guarding or rebound. Musculoskeletal:         General: No deformity. Normal range of motion. Cervical back: Normal range of motion and neck supple. Comments: No reproduction of pain with palpation or movement of the shoulder currently pain-free   Skin:     General: Skin is warm and dry. Capillary Refill: Capillary refill takes less than 2 seconds. Findings: No rash. Neurological:      Mental Status: She is alert and oriented to person, place, and time. Psychiatric:         Behavior: Behavior normal.        MDM     Amount and/or Complexity of Data Reviewed  Clinical lab tests: reviewed      ED Course as of 12/10/22 1839   Sat Dec 10, 2022   1637 EKG time 1630  Normal sinus rhythm prolonged QTC, rate of 89, nonspecific ST-T wave changes without ST elevations or depressions [CC]      ED Course User Index  [CC] Abel Baires, DO       Procedures    Work-up:  UA clear without proteinuria  No leukocytosis or anemia  Mildly hypokalemic with normal renal function and otherwise normal electrolytes  Lipase normal  Troponin negative     64-year-old female presenting today with \"a wave\" of symptoms that hit her several hours ago which included right upper quadrant pain, left arm pain, indigestion and nausea. Has had some of these symptoms intermittently for a few years now and evaluated by GI for the same. On exam she is mildly hypertensive but otherwise stable vital signs.   No tenderness in the abdomen on exam.  She has already had her gallbladder removed so I do not feel that ultrasound is necessary. Lipase not consistent with pancreatitis. Bilirubin and LFTs not consistent with biliary obstructive process or acute hepatitis. Troponin and EKG not consistent with ACS. She has been told this may be spasm due to the sphincter of Oddi which is possible. At this point I feel she is stable for discharge home with GI follow-up. ICD-10-CM ICD-9-CM    1. RUQ abdominal pain  R10.11 789.01       2.  Acute pain of left shoulder  M25.512 719.41           Disposition: Discharge    [facility] [address], DO

## 2022-12-10 NOTE — ED TRIAGE NOTES
Pt arrives to ED for diarrhea,nausea,  RUQ pain, upper abd spasms and left shoulder/arm pain. States this has been intermittent for \"awhile\" but today has been worse.

## 2022-12-14 LAB
ATRIAL RATE: 89 BPM
CALCULATED P AXIS, ECG09: 54 DEGREES
CALCULATED R AXIS, ECG10: 43 DEGREES
CALCULATED T AXIS, ECG11: 51 DEGREES
DIAGNOSIS, 93000: NORMAL
P-R INTERVAL, ECG05: 208 MS
Q-T INTERVAL, ECG07: 412 MS
QRS DURATION, ECG06: 86 MS
QTC CALCULATION (BEZET), ECG08: 501 MS
VENTRICULAR RATE, ECG03: 89 BPM